# Patient Record
Sex: FEMALE | Race: OTHER | HISPANIC OR LATINO
[De-identification: names, ages, dates, MRNs, and addresses within clinical notes are randomized per-mention and may not be internally consistent; named-entity substitution may affect disease eponyms.]

---

## 2017-01-23 ENCOUNTER — RESULT REVIEW (OUTPATIENT)
Age: 52
End: 2017-01-23

## 2017-01-30 ENCOUNTER — APPOINTMENT (OUTPATIENT)
Dept: ENDOCRINOLOGY | Facility: CLINIC | Age: 52
End: 2017-01-30

## 2017-01-30 VITALS — WEIGHT: 118 LBS | BODY MASS INDEX: 21.58 KG/M2

## 2017-03-27 ENCOUNTER — APPOINTMENT (OUTPATIENT)
Dept: ENDOCRINOLOGY | Facility: CLINIC | Age: 52
End: 2017-03-27

## 2017-05-08 ENCOUNTER — APPOINTMENT (OUTPATIENT)
Dept: ENDOCRINOLOGY | Facility: CLINIC | Age: 52
End: 2017-05-08

## 2017-07-05 ENCOUNTER — OUTPATIENT (OUTPATIENT)
Dept: OUTPATIENT SERVICES | Facility: HOSPITAL | Age: 52
LOS: 1 days | End: 2017-07-05
Payer: COMMERCIAL

## 2017-07-05 ENCOUNTER — TRANSCRIPTION ENCOUNTER (OUTPATIENT)
Age: 52
End: 2017-07-05

## 2017-07-05 ENCOUNTER — APPOINTMENT (OUTPATIENT)
Dept: ULTRASOUND IMAGING | Facility: CLINIC | Age: 52
End: 2017-07-05

## 2017-07-05 DIAGNOSIS — Z36 ENCOUNTER FOR ANTENATAL SCREENING OF MOTHER: Chronic | ICD-10-CM

## 2017-07-05 DIAGNOSIS — Z00.8 ENCOUNTER FOR OTHER GENERAL EXAMINATION: ICD-10-CM

## 2017-07-05 PROCEDURE — 76770 US EXAM ABDO BACK WALL COMP: CPT

## 2017-07-14 ENCOUNTER — APPOINTMENT (OUTPATIENT)
Dept: UROLOGY | Facility: CLINIC | Age: 52
End: 2017-07-14

## 2017-07-14 VITALS
SYSTOLIC BLOOD PRESSURE: 160 MMHG | BODY MASS INDEX: 20.98 KG/M2 | TEMPERATURE: 99.1 F | HEART RATE: 91 BPM | HEIGHT: 62 IN | DIASTOLIC BLOOD PRESSURE: 77 MMHG | WEIGHT: 114 LBS

## 2017-07-16 ENCOUNTER — MEDICATION RENEWAL (OUTPATIENT)
Age: 52
End: 2017-07-16

## 2017-07-18 LAB
BILIRUB UR QL STRIP: NORMAL
GLUCOSE UR-MCNC: NORMAL
HCG UR QL: 0.2 EU/DL
HGB UR QL STRIP.AUTO: NORMAL
KETONES UR-MCNC: NORMAL
LEUKOCYTE ESTERASE UR QL STRIP: NORMAL
NITRITE UR QL STRIP: NORMAL
PH UR STRIP: 5.5
PROT UR STRIP-MCNC: NORMAL
SP GR UR STRIP: 1.02

## 2017-07-19 ENCOUNTER — RX RENEWAL (OUTPATIENT)
Age: 52
End: 2017-07-19

## 2017-07-19 PROBLEM — Z00.00 ENCOUNTER FOR PREVENTIVE HEALTH EXAMINATION: Status: ACTIVE | Noted: 2017-07-19

## 2017-07-23 ENCOUNTER — FORM ENCOUNTER (OUTPATIENT)
Age: 52
End: 2017-07-23

## 2017-07-24 ENCOUNTER — OUTPATIENT (OUTPATIENT)
Dept: OUTPATIENT SERVICES | Facility: HOSPITAL | Age: 52
LOS: 1 days | End: 2017-07-24
Payer: COMMERCIAL

## 2017-07-24 ENCOUNTER — APPOINTMENT (OUTPATIENT)
Dept: ULTRASOUND IMAGING | Facility: IMAGING CENTER | Age: 52
End: 2017-07-24

## 2017-07-24 DIAGNOSIS — Z36 ENCOUNTER FOR ANTENATAL SCREENING OF MOTHER: Chronic | ICD-10-CM

## 2017-07-24 DIAGNOSIS — Z00.8 ENCOUNTER FOR OTHER GENERAL EXAMINATION: ICD-10-CM

## 2017-07-24 PROCEDURE — 76770 US EXAM ABDO BACK WALL COMP: CPT

## 2017-08-14 ENCOUNTER — APPOINTMENT (OUTPATIENT)
Dept: ENDOCRINOLOGY | Facility: CLINIC | Age: 52
End: 2017-08-14
Payer: COMMERCIAL

## 2017-08-14 VITALS
WEIGHT: 121 LBS | BODY MASS INDEX: 22.26 KG/M2 | SYSTOLIC BLOOD PRESSURE: 140 MMHG | OXYGEN SATURATION: 98 % | DIASTOLIC BLOOD PRESSURE: 62 MMHG | HEART RATE: 89 BPM | HEIGHT: 62 IN

## 2017-08-14 DIAGNOSIS — Z78.9 OTHER SPECIFIED HEALTH STATUS: ICD-10-CM

## 2017-08-14 LAB
GLUCOSE BLDC GLUCOMTR-MCNC: 88
HBA1C MFR BLD HPLC: 8.6 %
HBA1C MFR BLD HPLC: 8.7

## 2017-08-14 PROCEDURE — 99215 OFFICE O/P EST HI 40 MIN: CPT | Mod: 25

## 2017-08-14 PROCEDURE — 83036 HEMOGLOBIN GLYCOSYLATED A1C: CPT | Mod: QW

## 2017-08-14 PROCEDURE — 82962 GLUCOSE BLOOD TEST: CPT

## 2017-08-15 ENCOUNTER — RESULT REVIEW (OUTPATIENT)
Age: 52
End: 2017-08-15

## 2017-08-15 LAB
ALBUMIN SERPL ELPH-MCNC: 3.6 G/DL
ALP BLD-CCNC: 104 U/L
ALT SERPL-CCNC: 19 U/L
ANION GAP SERPL CALC-SCNC: 16 MMOL/L
AST SERPL-CCNC: 17 U/L
BILIRUB SERPL-MCNC: 0.2 MG/DL
BUN SERPL-MCNC: 35 MG/DL
CALCIUM SERPL-MCNC: 9.2 MG/DL
CHLORIDE SERPL-SCNC: 105 MMOL/L
CHOLEST SERPL-MCNC: 165 MG/DL
CHOLEST/HDLC SERPL: 2.8 RATIO
CO2 SERPL-SCNC: 23 MMOL/L
CREAT SERPL-MCNC: 0.67 MG/DL
CREAT SPEC-SCNC: 71 MG/DL
GLUCOSE SERPL-MCNC: 76 MG/DL
HDLC SERPL-MCNC: 60 MG/DL
LDLC SERPL CALC-MCNC: 88 MG/DL
MICROALBUMIN 24H UR DL<=1MG/L-MCNC: 32.7 MG/DL
MICROALBUMIN/CREAT 24H UR-RTO: 461 MG/G
POTASSIUM SERPL-SCNC: 5.2 MMOL/L
PROT SERPL-MCNC: 7.3 G/DL
SODIUM SERPL-SCNC: 144 MMOL/L
TRIGL SERPL-MCNC: 86 MG/DL

## 2017-09-01 ENCOUNTER — FORM ENCOUNTER (OUTPATIENT)
Age: 52
End: 2017-09-01

## 2017-09-02 ENCOUNTER — OUTPATIENT (OUTPATIENT)
Dept: OUTPATIENT SERVICES | Facility: HOSPITAL | Age: 52
LOS: 1 days | End: 2017-09-02
Payer: COMMERCIAL

## 2017-09-02 ENCOUNTER — APPOINTMENT (OUTPATIENT)
Dept: CT IMAGING | Facility: IMAGING CENTER | Age: 52
End: 2017-09-02
Payer: COMMERCIAL

## 2017-09-02 DIAGNOSIS — Z00.8 ENCOUNTER FOR OTHER GENERAL EXAMINATION: ICD-10-CM

## 2017-09-02 DIAGNOSIS — N13.30 UNSPECIFIED HYDRONEPHROSIS: ICD-10-CM

## 2017-09-02 DIAGNOSIS — Z36 ENCOUNTER FOR ANTENATAL SCREENING OF MOTHER: Chronic | ICD-10-CM

## 2017-09-02 PROCEDURE — 74178 CT ABD&PLV WO CNTR FLWD CNTR: CPT

## 2017-09-02 PROCEDURE — 74178 CT ABD&PLV WO CNTR FLWD CNTR: CPT | Mod: 26

## 2017-09-05 ENCOUNTER — APPOINTMENT (OUTPATIENT)
Dept: UROLOGY | Facility: CLINIC | Age: 52
End: 2017-09-05
Payer: COMMERCIAL

## 2017-09-05 PROCEDURE — 99213 OFFICE O/P EST LOW 20 MIN: CPT

## 2017-09-07 ENCOUNTER — OUTPATIENT (OUTPATIENT)
Dept: OUTPATIENT SERVICES | Facility: HOSPITAL | Age: 52
LOS: 1 days | End: 2017-09-07
Payer: COMMERCIAL

## 2017-09-07 ENCOUNTER — RESULT REVIEW (OUTPATIENT)
Age: 52
End: 2017-09-07

## 2017-09-07 VITALS
TEMPERATURE: 97 F | DIASTOLIC BLOOD PRESSURE: 81 MMHG | HEIGHT: 62 IN | RESPIRATION RATE: 16 BRPM | OXYGEN SATURATION: 99 % | SYSTOLIC BLOOD PRESSURE: 150 MMHG | HEART RATE: 83 BPM | WEIGHT: 119.93 LBS

## 2017-09-07 DIAGNOSIS — Z98.891 HISTORY OF UTERINE SCAR FROM PREVIOUS SURGERY: Chronic | ICD-10-CM

## 2017-09-07 DIAGNOSIS — N13.30 UNSPECIFIED HYDRONEPHROSIS: ICD-10-CM

## 2017-09-07 DIAGNOSIS — Z36 ENCOUNTER FOR ANTENATAL SCREENING OF MOTHER: Chronic | ICD-10-CM

## 2017-09-07 DIAGNOSIS — N39.0 URINARY TRACT INFECTION, SITE NOT SPECIFIED: ICD-10-CM

## 2017-09-07 DIAGNOSIS — Q62.0 CONGENITAL HYDRONEPHROSIS: ICD-10-CM

## 2017-09-07 DIAGNOSIS — Z98.51 TUBAL LIGATION STATUS: Chronic | ICD-10-CM

## 2017-09-07 DIAGNOSIS — Z01.818 ENCOUNTER FOR OTHER PREPROCEDURAL EXAMINATION: ICD-10-CM

## 2017-09-07 LAB
ANION GAP SERPL CALC-SCNC: 14 MMOL/L — SIGNIFICANT CHANGE UP (ref 5–17)
BACTERIA UR CULT: ABNORMAL
BUN SERPL-MCNC: 29 MG/DL — HIGH (ref 7–23)
CALCIUM SERPL-MCNC: 9.5 MG/DL — SIGNIFICANT CHANGE UP (ref 8.4–10.5)
CHLORIDE SERPL-SCNC: 104 MMOL/L — SIGNIFICANT CHANGE UP (ref 96–108)
CO2 SERPL-SCNC: 24 MMOL/L — SIGNIFICANT CHANGE UP (ref 22–31)
CORE LAB FLUID CYTOLOGY: NORMAL
CREAT SERPL-MCNC: 0.68 MG/DL — SIGNIFICANT CHANGE UP (ref 0.5–1.3)
GLUCOSE SERPL-MCNC: 135 MG/DL — HIGH (ref 70–99)
HCT VFR BLD CALC: 30.5 % — LOW (ref 34.5–45)
HGB BLD-MCNC: 9.6 G/DL — LOW (ref 11.5–15.5)
MCHC RBC-ENTMCNC: 28.8 PG — SIGNIFICANT CHANGE UP (ref 27–34)
MCHC RBC-ENTMCNC: 31.5 GM/DL — LOW (ref 32–36)
MCV RBC AUTO: 91.6 FL — SIGNIFICANT CHANGE UP (ref 80–100)
PLATELET # BLD AUTO: 299 K/UL — SIGNIFICANT CHANGE UP (ref 150–400)
POTASSIUM SERPL-MCNC: 4.6 MMOL/L — SIGNIFICANT CHANGE UP (ref 3.5–5.3)
POTASSIUM SERPL-SCNC: 4.6 MMOL/L — SIGNIFICANT CHANGE UP (ref 3.5–5.3)
RBC # BLD: 3.33 M/UL — LOW (ref 3.8–5.2)
RBC # FLD: 14.6 % — HIGH (ref 10.3–14.5)
SODIUM SERPL-SCNC: 142 MMOL/L — SIGNIFICANT CHANGE UP (ref 135–145)
WBC # BLD: 8.8 K/UL — SIGNIFICANT CHANGE UP (ref 3.8–10.5)
WBC # FLD AUTO: 8.8 K/UL — SIGNIFICANT CHANGE UP (ref 3.8–10.5)

## 2017-09-07 PROCEDURE — G0463: CPT

## 2017-09-07 PROCEDURE — 85027 COMPLETE CBC AUTOMATED: CPT

## 2017-09-07 PROCEDURE — 80048 BASIC METABOLIC PNL TOTAL CA: CPT

## 2017-09-07 RX ORDER — SODIUM CHLORIDE 9 MG/ML
3 INJECTION INTRAMUSCULAR; INTRAVENOUS; SUBCUTANEOUS EVERY 8 HOURS
Qty: 0 | Refills: 0 | Status: DISCONTINUED | OUTPATIENT
Start: 2017-11-27 | End: 2017-12-12

## 2017-09-07 RX ORDER — CEFAZOLIN SODIUM 1 G
2000 VIAL (EA) INJECTION ONCE
Qty: 0 | Refills: 0 | Status: DISCONTINUED | OUTPATIENT
Start: 2017-11-27 | End: 2017-12-12

## 2017-09-07 RX ORDER — LIDOCAINE HCL 20 MG/ML
0.2 VIAL (ML) INJECTION ONCE
Qty: 0 | Refills: 0 | Status: DISCONTINUED | OUTPATIENT
Start: 2017-11-27 | End: 2017-12-12

## 2017-09-07 NOTE — H&P PST ADULT - PMH
Anxiety    Blindness of left eye    Diabetic retinopathy    DM (diabetes mellitus)  type 1  Essential hypertension    Hydronephrosis    Hyperlipidemia    UTI (urinary tract infection)  09/2017

## 2017-09-07 NOTE — H&P PST ADULT - NSANTHOSAYNRD_GEN_A_CORE
No. СВЕТЛАНА screening performed.  STOP BANG Legend: 0-2 = LOW Risk; 3-4 = INTERMEDIATE Risk; 5-8 = HIGH Risk

## 2017-09-07 NOTE — H&P PST ADULT - HISTORY OF PRESENT ILLNESS
Patient is a 52 year old female with PMHx of Type I Diabetes, retinopathy, Left eye blindness, HTN, h/o one episode of Left flank pain 2 months ago. A CT scan of abdomen and pelvis revealed mild Left hydronephrosis with ureteral obstruction but no stone. Urine culture was negative at the time. She now needs further evaluation and is scheduled for Cystoscopy, Left Retrograde Pyelogram, Left Ureteroscopy and Biopsy, JJ Stent Insertion.     Of note, urine culture from 09/05/17 positive for E. coli. Dr. WOLF Young notified. Patient at [facility] today (09/07/17), denies any urinary symptoms. Patient is a 52 year old female with PMHx of Type I Diabetes, retinopathy, Left eye blindness, HTN, h/o one episode of Left flank pain 2 months ago. A CT scan of abdomen and pelvis revealed mild Left hydronephrosis with ureteral obstruction but no stone. Urine culture was negative at the time. She now needs further evaluation and is scheduled for Cystoscopy, Left Retrograde Pyelogram, Left Ureteroscopy and Biopsy, JJ Stent Insertion.     Of note, urine culture from 09/05/17 positive for E. coli. Dr. WOLF Young notified. Patient at [facility] today (09/07/17), denies any urinary symptoms.   Addendum: patient is starting Bactrim tonight as per Dr. WOLF Young (09/07/17)

## 2017-09-07 NOTE — H&P PST ADULT - PROBLEM SELECTOR PLAN 2
Dr. Young notified Dr. Young notified    Medical evaluation scheduled for 09/08/17 Dr. Young notified  Spoke to the patient  at 17:30, she is starting Bactrim tonight as per Dr. WOLF Young (09/07/17)      Medical evaluation scheduled for 09/08/17

## 2017-09-08 ENCOUNTER — MESSAGE (OUTPATIENT)
Age: 52
End: 2017-09-08

## 2017-09-10 ENCOUNTER — EMERGENCY (EMERGENCY)
Facility: HOSPITAL | Age: 52
LOS: 1 days | Discharge: HOME | End: 2017-09-10
Attending: EMERGENCY MEDICINE | Admitting: EMERGENCY MEDICINE
Payer: COMMERCIAL

## 2017-09-10 VITALS — HEART RATE: 85 BPM | RESPIRATION RATE: 14 BRPM | DIASTOLIC BLOOD PRESSURE: 76 MMHG | SYSTOLIC BLOOD PRESSURE: 166 MMHG

## 2017-09-10 DIAGNOSIS — Z98.51 TUBAL LIGATION STATUS: Chronic | ICD-10-CM

## 2017-09-10 DIAGNOSIS — Z98.891 HISTORY OF UTERINE SCAR FROM PREVIOUS SURGERY: Chronic | ICD-10-CM

## 2017-09-10 PROCEDURE — 73030 X-RAY EXAM OF SHOULDER: CPT | Mod: 26,RT

## 2017-09-10 PROCEDURE — 73030 X-RAY EXAM OF SHOULDER: CPT

## 2017-09-10 PROCEDURE — 73060 X-RAY EXAM OF HUMERUS: CPT

## 2017-09-10 PROCEDURE — 99284 EMERGENCY DEPT VISIT MOD MDM: CPT

## 2017-09-10 PROCEDURE — 73060 X-RAY EXAM OF HUMERUS: CPT | Mod: 26,RT

## 2017-09-10 PROCEDURE — 99284 EMERGENCY DEPT VISIT MOD MDM: CPT | Mod: 25

## 2017-09-10 RX ORDER — OXYCODONE HYDROCHLORIDE 5 MG/1
1 TABLET ORAL
Qty: 16 | Refills: 0 | OUTPATIENT
Start: 2017-09-10 | End: 2017-09-14

## 2017-09-10 RX ORDER — IBUPROFEN 200 MG
1 TABLET ORAL
Qty: 28 | Refills: 0 | OUTPATIENT
Start: 2017-09-10 | End: 2017-09-17

## 2017-09-10 RX ORDER — OXYCODONE HYDROCHLORIDE 5 MG/1
5 TABLET ORAL ONCE
Qty: 0 | Refills: 0 | Status: DISCONTINUED | OUTPATIENT
Start: 2017-09-10 | End: 2017-09-10

## 2017-09-10 RX ORDER — IBUPROFEN 200 MG
600 TABLET ORAL ONCE
Qty: 0 | Refills: 0 | Status: COMPLETED | OUTPATIENT
Start: 2017-09-10 | End: 2017-09-10

## 2017-09-10 RX ORDER — ACETAMINOPHEN 500 MG
975 TABLET ORAL ONCE
Qty: 0 | Refills: 0 | Status: COMPLETED | OUTPATIENT
Start: 2017-09-10 | End: 2017-09-10

## 2017-09-10 RX ADMIN — OXYCODONE HYDROCHLORIDE 5 MILLIGRAM(S): 5 TABLET ORAL at 18:43

## 2017-09-10 RX ADMIN — Medication 600 MILLIGRAM(S): at 19:36

## 2017-09-10 RX ADMIN — Medication 975 MILLIGRAM(S): at 19:36
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
## 2017-09-10 NOTE — ED PROVIDER NOTE - MEDICAL DECISION MAKING DETAILS
Attending Note (Rossana): patient with rue pain after mechanical injury. nv intact. xrays r/o fracture/dislocation.

## 2017-09-11 ENCOUNTER — APPOINTMENT (OUTPATIENT)
Dept: UROLOGY | Facility: HOSPITAL | Age: 52
End: 2017-09-11

## 2017-10-25 ENCOUNTER — MESSAGE (OUTPATIENT)
Age: 52
End: 2017-10-25

## 2017-11-16 ENCOUNTER — RX RENEWAL (OUTPATIENT)
Age: 52
End: 2017-11-16

## 2017-11-20 ENCOUNTER — OUTPATIENT (OUTPATIENT)
Dept: OUTPATIENT SERVICES | Facility: HOSPITAL | Age: 52
LOS: 1 days | End: 2017-11-20
Payer: MEDICAID

## 2017-11-20 VITALS
WEIGHT: 111.99 LBS | HEART RATE: 96 BPM | TEMPERATURE: 98 F | DIASTOLIC BLOOD PRESSURE: 82 MMHG | OXYGEN SATURATION: 99 % | HEIGHT: 62 IN | SYSTOLIC BLOOD PRESSURE: 150 MMHG | RESPIRATION RATE: 16 BRPM

## 2017-11-20 DIAGNOSIS — Z01.818 ENCOUNTER FOR OTHER PREPROCEDURAL EXAMINATION: ICD-10-CM

## 2017-11-20 DIAGNOSIS — Z98.891 HISTORY OF UTERINE SCAR FROM PREVIOUS SURGERY: Chronic | ICD-10-CM

## 2017-11-20 DIAGNOSIS — Z98.51 TUBAL LIGATION STATUS: Chronic | ICD-10-CM

## 2017-11-20 DIAGNOSIS — E10.39 TYPE 1 DIABETES MELLITUS WITH OTHER DIABETIC OPHTHALMIC COMPLICATION: ICD-10-CM

## 2017-11-20 DIAGNOSIS — Z87.81 PERSONAL HISTORY OF (HEALED) TRAUMATIC FRACTURE: Chronic | ICD-10-CM

## 2017-11-20 DIAGNOSIS — N13.30 UNSPECIFIED HYDRONEPHROSIS: ICD-10-CM

## 2017-11-20 DIAGNOSIS — I10 ESSENTIAL (PRIMARY) HYPERTENSION: ICD-10-CM

## 2017-11-20 LAB
ANION GAP SERPL CALC-SCNC: 12 MMOL/L — SIGNIFICANT CHANGE UP (ref 5–17)
BUN SERPL-MCNC: 32 MG/DL — HIGH (ref 7–23)
CALCIUM SERPL-MCNC: 10 MG/DL — SIGNIFICANT CHANGE UP (ref 8.4–10.5)
CHLORIDE SERPL-SCNC: 104 MMOL/L — SIGNIFICANT CHANGE UP (ref 96–108)
CO2 SERPL-SCNC: 22 MMOL/L — SIGNIFICANT CHANGE UP (ref 22–31)
CREAT SERPL-MCNC: 0.85 MG/DL — SIGNIFICANT CHANGE UP (ref 0.5–1.3)
GLUCOSE SERPL-MCNC: 136 MG/DL — HIGH (ref 70–99)
HBA1C BLD-MCNC: 8 % — HIGH (ref 4–5.6)
HCT VFR BLD CALC: 35.5 % — SIGNIFICANT CHANGE UP (ref 34.5–45)
HGB BLD-MCNC: 11.4 G/DL — LOW (ref 11.5–15.5)
MCHC RBC-ENTMCNC: 28.9 PG — SIGNIFICANT CHANGE UP (ref 27–34)
MCHC RBC-ENTMCNC: 32.1 GM/DL — SIGNIFICANT CHANGE UP (ref 32–36)
MCV RBC AUTO: 89.9 FL — SIGNIFICANT CHANGE UP (ref 80–100)
PLATELET # BLD AUTO: 305 K/UL — SIGNIFICANT CHANGE UP (ref 150–400)
POTASSIUM SERPL-MCNC: 6.3 MMOL/L — CRITICAL HIGH (ref 3.5–5.3)
POTASSIUM SERPL-SCNC: 6.3 MMOL/L — CRITICAL HIGH (ref 3.5–5.3)
RBC # BLD: 3.95 M/UL — SIGNIFICANT CHANGE UP (ref 3.8–5.2)
RBC # FLD: 13.9 % — SIGNIFICANT CHANGE UP (ref 10.3–14.5)
SODIUM SERPL-SCNC: 138 MMOL/L — SIGNIFICANT CHANGE UP (ref 135–145)
WBC # BLD: 9.8 K/UL — SIGNIFICANT CHANGE UP (ref 3.8–10.5)
WBC # FLD AUTO: 9.8 K/UL — SIGNIFICANT CHANGE UP (ref 3.8–10.5)

## 2017-11-20 RX ORDER — SODIUM CHLORIDE 9 MG/ML
3 INJECTION INTRAMUSCULAR; INTRAVENOUS; SUBCUTANEOUS EVERY 8 HOURS
Qty: 0 | Refills: 0 | Status: DISCONTINUED | OUTPATIENT
Start: 2017-11-27 | End: 2017-12-12

## 2017-11-20 RX ORDER — CEFAZOLIN SODIUM 1 G
2000 VIAL (EA) INJECTION ONCE
Qty: 0 | Refills: 0 | Status: DISCONTINUED | OUTPATIENT
Start: 2017-11-27 | End: 2017-12-12

## 2017-11-20 NOTE — H&P PST ADULT - PMH
Anxiety    Blindness of left eye    Diabetic retinopathy    DM (diabetes mellitus)  type 1- since 1980  Essential hypertension    Hydronephrosis    Hyperlipidemia    UTI (urinary tract infection)  09/2017

## 2017-11-20 NOTE — H&P PST ADULT - VISION (WITH CORRECTIVE LENSES IF THE PATIENT USUALLY WEARS THEM):
Partially impaired: cannot see medication labels or newsprint, but can see obstacles in path, and the surrounding layout; can count fingers at arm's length Left eye blindness/Partially impaired: cannot see medication labels or newsprint, but can see obstacles in path, and the surrounding layout; can count fingers at arm's length

## 2017-11-20 NOTE — H&P PST ADULT - HISTORY OF PRESENT ILLNESS
Patient is a 52 year old female with PMHx of Type I Diabetes, retinopathy, Left eye blindness, HTN, h/o one episode of Left flank pain 2 months ago. A CT scan of abdomen and pelvis revealed mild Left hydronephrosis with ureteral obstruction but no stone. Urine culture was negative at the time. She now needs further evaluation and is scheduled for Cystoscopy, Left Retrograde Pyelogram, Left Ureteroscopy and Biopsy, JJ Stent Insertion.     Of note, urine culture from 09/05/17 positive for E. coli. Dr. WOLF Young notified. Patient at [email or web address] today (09/07/17), denies any urinary symptoms.   Addendum: patient is starting Bactrim tonight as per Dr. WOLF Young (09/07/17) Patient is a 52 year old female with PMHx of Type I Diabetes, retinopathy, Left eye blindness, HTN, h/o one episode of Left flank pain in 07/2017. Pt had urology consult- s/p pelvic sonogram/ CT scan of abdomen and pelvis revealed mild Left hydronephrosis with ureteral obstruction but no stone. Pt for Cystoscopy, Left Retrograde Pyelogram, Left Ureteroscopy and Biopsy, JJ Stent Insertion.                    ****Surgery was cancelled in 09/2017 due right shoulder fracture & pt had right shoulder repair on 09/21/2017

## 2017-11-21 ENCOUNTER — EMERGENCY (EMERGENCY)
Facility: HOSPITAL | Age: 52
LOS: 1 days | Discharge: HOME | End: 2017-11-21
Attending: EMERGENCY MEDICINE
Payer: MEDICAID

## 2017-11-21 ENCOUNTER — MESSAGE (OUTPATIENT)
Age: 52
End: 2017-11-21

## 2017-11-21 VITALS
HEART RATE: 102 BPM | SYSTOLIC BLOOD PRESSURE: 198 MMHG | OXYGEN SATURATION: 99 % | RESPIRATION RATE: 18 BRPM | DIASTOLIC BLOOD PRESSURE: 75 MMHG | TEMPERATURE: 97 F

## 2017-11-21 DIAGNOSIS — Z98.891 HISTORY OF UTERINE SCAR FROM PREVIOUS SURGERY: Chronic | ICD-10-CM

## 2017-11-21 DIAGNOSIS — Z87.81 PERSONAL HISTORY OF (HEALED) TRAUMATIC FRACTURE: Chronic | ICD-10-CM

## 2017-11-21 DIAGNOSIS — Z98.51 TUBAL LIGATION STATUS: Chronic | ICD-10-CM

## 2017-11-21 LAB
CULTURE RESULTS: SIGNIFICANT CHANGE UP
SPECIMEN SOURCE: SIGNIFICANT CHANGE UP

## 2017-11-21 PROCEDURE — 99284 EMERGENCY DEPT VISIT MOD MDM: CPT | Mod: 25

## 2017-11-21 PROCEDURE — 93010 ELECTROCARDIOGRAM REPORT: CPT

## 2017-11-21 NOTE — ED ADULT NURSE NOTE - OBJECTIVE STATEMENT
pt states, "I had blood work drawn for presurgical testing and today I got a call saying that my potassium was elevated (6.2) and they wanted me to come into the ED to get an EKG done and get something to lower my potassium" pt denies any SOB, chest pain, n/v/d at present

## 2017-11-21 NOTE — ED ADULT NURSE NOTE - CHPI ED SYMPTOMS NEG
no tingling/no weakness/no vomiting/no dizziness/no numbness/no pain/no chills/no fever/no nausea/no decreased eating/drinking

## 2017-11-22 ENCOUNTER — RESULT REVIEW (OUTPATIENT)
Age: 52
End: 2017-11-22

## 2017-11-22 VITALS
DIASTOLIC BLOOD PRESSURE: 68 MMHG | RESPIRATION RATE: 18 BRPM | OXYGEN SATURATION: 98 % | TEMPERATURE: 98 F | HEART RATE: 98 BPM | SYSTOLIC BLOOD PRESSURE: 172 MMHG

## 2017-11-22 LAB
ALBUMIN SERPL ELPH-MCNC: 3.6 G/DL — SIGNIFICANT CHANGE UP (ref 3.3–5)
ALBUMIN SERPL ELPH-MCNC: 3.9 G/DL — SIGNIFICANT CHANGE UP (ref 3.3–5)
ALP SERPL-CCNC: 126 U/L — HIGH (ref 40–120)
ALP SERPL-CCNC: 151 U/L — HIGH (ref 40–120)
ALT FLD-CCNC: 24 U/L RC — SIGNIFICANT CHANGE UP (ref 10–45)
ALT FLD-CCNC: 25 U/L RC — SIGNIFICANT CHANGE UP (ref 10–45)
ANION GAP SERPL CALC-SCNC: 12 MMOL/L — SIGNIFICANT CHANGE UP (ref 5–17)
ANION GAP SERPL CALC-SCNC: 14 MMOL/L — SIGNIFICANT CHANGE UP (ref 5–17)
AST SERPL-CCNC: 13 U/L — SIGNIFICANT CHANGE UP (ref 10–40)
AST SERPL-CCNC: 17 U/L — SIGNIFICANT CHANGE UP (ref 10–40)
BASE EXCESS BLDV CALC-SCNC: -2.8 MMOL/L — LOW (ref -2–2)
BASOPHILS # BLD AUTO: 0.1 K/UL — SIGNIFICANT CHANGE UP (ref 0–0.2)
BASOPHILS NFR BLD AUTO: 0.6 % — SIGNIFICANT CHANGE UP (ref 0–2)
BILIRUB SERPL-MCNC: 0.2 MG/DL — SIGNIFICANT CHANGE UP (ref 0.2–1.2)
BILIRUB SERPL-MCNC: 0.2 MG/DL — SIGNIFICANT CHANGE UP (ref 0.2–1.2)
BUN SERPL-MCNC: 32 MG/DL — HIGH (ref 7–23)
BUN SERPL-MCNC: 33 MG/DL — HIGH (ref 7–23)
CA-I SERPL-SCNC: 1.34 MMOL/L — HIGH (ref 1.12–1.3)
CALCIUM SERPL-MCNC: 9.5 MG/DL — SIGNIFICANT CHANGE UP (ref 8.4–10.5)
CALCIUM SERPL-MCNC: 9.8 MG/DL — SIGNIFICANT CHANGE UP (ref 8.4–10.5)
CHLORIDE BLDV-SCNC: 108 MMOL/L — SIGNIFICANT CHANGE UP (ref 96–108)
CHLORIDE SERPL-SCNC: 103 MMOL/L — SIGNIFICANT CHANGE UP (ref 96–108)
CHLORIDE SERPL-SCNC: 105 MMOL/L — SIGNIFICANT CHANGE UP (ref 96–108)
CO2 BLDV-SCNC: 24 MMOL/L — SIGNIFICANT CHANGE UP (ref 22–30)
CO2 SERPL-SCNC: 23 MMOL/L — SIGNIFICANT CHANGE UP (ref 22–31)
CO2 SERPL-SCNC: 24 MMOL/L — SIGNIFICANT CHANGE UP (ref 22–31)
CREAT SERPL-MCNC: 0.81 MG/DL — SIGNIFICANT CHANGE UP (ref 0.5–1.3)
CREAT SERPL-MCNC: 0.89 MG/DL — SIGNIFICANT CHANGE UP (ref 0.5–1.3)
EOSINOPHIL # BLD AUTO: 0.2 K/UL — SIGNIFICANT CHANGE UP (ref 0–0.5)
EOSINOPHIL NFR BLD AUTO: 1.6 % — SIGNIFICANT CHANGE UP (ref 0–6)
GAS PNL BLDV: 139 MMOL/L — SIGNIFICANT CHANGE UP (ref 136–145)
GAS PNL BLDV: SIGNIFICANT CHANGE UP
GLUCOSE BLDV-MCNC: 117 MG/DL — HIGH (ref 70–99)
GLUCOSE SERPL-MCNC: 124 MG/DL — HIGH (ref 70–99)
GLUCOSE SERPL-MCNC: 375 MG/DL — HIGH (ref 70–99)
HCO3 BLDV-SCNC: 23 MMOL/L — SIGNIFICANT CHANGE UP (ref 21–29)
HCT VFR BLD CALC: 35.4 % — SIGNIFICANT CHANGE UP (ref 34.5–45)
HCT VFR BLDA CALC: 33 % — LOW (ref 39–50)
HGB BLD CALC-MCNC: 10.6 G/DL — LOW (ref 11.5–15.5)
HGB BLD-MCNC: 11.5 G/DL — SIGNIFICANT CHANGE UP (ref 11.5–15.5)
LACTATE BLDV-MCNC: 2.2 MMOL/L — HIGH (ref 0.7–2)
LYMPHOCYTES # BLD AUTO: 2.3 K/UL — SIGNIFICANT CHANGE UP (ref 1–3.3)
LYMPHOCYTES # BLD AUTO: 24 % — SIGNIFICANT CHANGE UP (ref 13–44)
MCHC RBC-ENTMCNC: 29.5 PG — SIGNIFICANT CHANGE UP (ref 27–34)
MCHC RBC-ENTMCNC: 32.6 GM/DL — SIGNIFICANT CHANGE UP (ref 32–36)
MCV RBC AUTO: 90.6 FL — SIGNIFICANT CHANGE UP (ref 80–100)
MONOCYTES # BLD AUTO: 0.4 K/UL — SIGNIFICANT CHANGE UP (ref 0–0.9)
MONOCYTES NFR BLD AUTO: 3.8 % — SIGNIFICANT CHANGE UP (ref 2–14)
NEUTROPHILS # BLD AUTO: 6.7 K/UL — SIGNIFICANT CHANGE UP (ref 1.8–7.4)
NEUTROPHILS NFR BLD AUTO: 70 % — SIGNIFICANT CHANGE UP (ref 43–77)
OTHER CELLS CSF MANUAL: 7 ML/DL — LOW (ref 18–22)
PCO2 BLDV: 46 MMHG — SIGNIFICANT CHANGE UP (ref 35–50)
PH BLDV: 7.32 — LOW (ref 7.35–7.45)
PLATELET # BLD AUTO: 263 K/UL — SIGNIFICANT CHANGE UP (ref 150–400)
PO2 BLDV: 28 MMHG — SIGNIFICANT CHANGE UP (ref 25–45)
POTASSIUM BLDV-SCNC: 3.9 MMOL/L — SIGNIFICANT CHANGE UP (ref 3.5–5)
POTASSIUM SERPL-MCNC: 4 MMOL/L — SIGNIFICANT CHANGE UP (ref 3.5–5.3)
POTASSIUM SERPL-MCNC: 5.9 MMOL/L — HIGH (ref 3.5–5.3)
POTASSIUM SERPL-SCNC: 4 MMOL/L — SIGNIFICANT CHANGE UP (ref 3.5–5.3)
POTASSIUM SERPL-SCNC: 5.9 MMOL/L — HIGH (ref 3.5–5.3)
PROT SERPL-MCNC: 7 G/DL — SIGNIFICANT CHANGE UP (ref 6–8.3)
PROT SERPL-MCNC: 7.9 G/DL — SIGNIFICANT CHANGE UP (ref 6–8.3)
RBC # BLD: 3.91 M/UL — SIGNIFICANT CHANGE UP (ref 3.8–5.2)
RBC # FLD: 12.4 % — SIGNIFICANT CHANGE UP (ref 10.3–14.5)
SAO2 % BLDV: 46 % — LOW (ref 67–88)
SODIUM SERPL-SCNC: 139 MMOL/L — SIGNIFICANT CHANGE UP (ref 135–145)
SODIUM SERPL-SCNC: 142 MMOL/L — SIGNIFICANT CHANGE UP (ref 135–145)
WBC # BLD: 9.5 K/UL — SIGNIFICANT CHANGE UP (ref 3.8–10.5)
WBC # FLD AUTO: 9.5 K/UL — SIGNIFICANT CHANGE UP (ref 3.8–10.5)

## 2017-11-22 PROCEDURE — 96375 TX/PRO/DX INJ NEW DRUG ADDON: CPT

## 2017-11-22 PROCEDURE — 99284 EMERGENCY DEPT VISIT MOD MDM: CPT | Mod: 25

## 2017-11-22 PROCEDURE — 80053 COMPREHEN METABOLIC PANEL: CPT

## 2017-11-22 PROCEDURE — 93005 ELECTROCARDIOGRAM TRACING: CPT

## 2017-11-22 PROCEDURE — 85027 COMPLETE CBC AUTOMATED: CPT

## 2017-11-22 PROCEDURE — 96374 THER/PROPH/DIAG INJ IV PUSH: CPT

## 2017-11-22 PROCEDURE — 84132 ASSAY OF SERUM POTASSIUM: CPT

## 2017-11-22 PROCEDURE — 94640 AIRWAY INHALATION TREATMENT: CPT

## 2017-11-22 PROCEDURE — 84295 ASSAY OF SERUM SODIUM: CPT

## 2017-11-22 PROCEDURE — 83605 ASSAY OF LACTIC ACID: CPT

## 2017-11-22 PROCEDURE — 82947 ASSAY GLUCOSE BLOOD QUANT: CPT

## 2017-11-22 PROCEDURE — 82435 ASSAY OF BLOOD CHLORIDE: CPT

## 2017-11-22 PROCEDURE — 82803 BLOOD GASES ANY COMBINATION: CPT

## 2017-11-22 PROCEDURE — 85014 HEMATOCRIT: CPT

## 2017-11-22 PROCEDURE — 82330 ASSAY OF CALCIUM: CPT

## 2017-11-22 RX ORDER — INSULIN HUMAN 100 [IU]/ML
10 INJECTION, SOLUTION SUBCUTANEOUS ONCE
Qty: 0 | Refills: 0 | Status: COMPLETED | OUTPATIENT
Start: 2017-11-22 | End: 2017-11-22

## 2017-11-22 RX ORDER — CALCIUM GLUCONATE 100 MG/ML
1 VIAL (ML) INTRAVENOUS ONCE
Qty: 0 | Refills: 0 | Status: COMPLETED | OUTPATIENT
Start: 2017-11-22 | End: 2017-11-22

## 2017-11-22 RX ORDER — ALBUTEROL 90 UG/1
2.5 AEROSOL, METERED ORAL ONCE
Qty: 0 | Refills: 0 | Status: COMPLETED | OUTPATIENT
Start: 2017-11-22 | End: 2017-11-22

## 2017-11-22 RX ADMIN — Medication 200 GRAM(S): at 02:36

## 2017-11-22 RX ADMIN — ALBUTEROL 2.5 MILLIGRAM(S): 90 AEROSOL, METERED ORAL at 02:09

## 2017-11-22 RX ADMIN — INSULIN HUMAN 10 UNIT(S): 100 INJECTION, SOLUTION SUBCUTANEOUS at 02:09

## 2017-11-22 NOTE — ED PROVIDER NOTE - ATTENDING CONTRIBUTION TO CARE
52F hx T1DM, HTN, HLD, Hydronephrosis. Presents to the ER for hyperkalemia sent from pre admission testing, with no sts noted.  pt with k 5.9 subtle tenting v3-4 noted, to treat for hyperkalemia and recheck k for possible discharge.

## 2017-11-22 NOTE — ED PROVIDER NOTE - CARE PLAN
Principal Discharge DX:	Hyperkalemia  Instructions for follow-up, activity and diet:	Follow up with your medical doctor in 2-3 days or call our clinic at [phone] and state you were seen in the Emergency Department and would like to be seen in clinic. If you experience muscle weakness, chest pain, palpations, shortness of breath seek urgent medical attention. Avoid eating foods that increase potassium i.e bananas, avocados, spinach Principal Discharge DX:	Hyperkalemia  Instructions for follow-up, activity and diet:	Follow up with your cardiologist and PMD in 2-3 days or call our clinic at [phone] and state you were seen in the Emergency Department and would like to be seen in clinic. If you experience muscle weakness, chest pain, palpations, shortness of breath seek urgent medical attention. Avoid eating foods that increase potassium i.e bananas, avocados, spinach.

## 2017-11-22 NOTE — ED PROVIDER NOTE - PLAN OF CARE
HPI Comments: Is here with nausea vomiting and diarrhea. Diarrhea began around 3:00 this afternoon. Had both nausea and vomiting at one point had a temperature up to 102.9. No recent trips or travels. No close contacts with similar. No blood or mucus to her stool. No history of baseline GI diseases. Patient's water sources. Really no significant abdominal discomfort. Denies any other symptoms such as cough sputum sore throat or otherno recent antibiotic use. No history of the same in the past.history of abdominal surgery. No history of known inflammatory bowel disease. Patient is a 27 y.o. female presenting with abdominal pain. The history is provided by the patient. Abdominal Pain    This is a new problem. The pain is associated with an unknown factor. The pain is located in the generalized abdominal region. The quality of the pain is cramping. The pain is mild. Associated symptoms include a fever, diarrhea, nausea and vomiting. Pertinent negatives include no hematochezia, no melena, no dysuria, no frequency and no hematuria. Nothing worsens the pain. The pain is relieved by nothing. Her past medical history does not include PUD, GERD, ulcerative colitis, Crohn's disease, irritable bowel syndrome, pancreatitis or diverticulitis.         Past Medical History:   Diagnosis Date    Anemia     Anemia in pregnancy 2/25/2016    Celiac disease     Genital herpes, unspecified     History    Herpes 1/10/2014    Mastitis     2 surgeries/right breast    Palpitations     Postpartum depression     no meds currently    Rh negative state in antepartum period 9/19/2013    Rhesus isoimmunization affecting pregnancy     Supervision of normal subsequent pregnancy 10/9/2015    1)HSV 2)rh neg 3)rubella nonimmune 4)anemia     Syncope and collapse     Unspecified ovarian cysts        Past Surgical History:   Procedure Laterality Date    BREAST SURGERY PROCEDURE UNLISTED      Mastitis, drained    HX CHOLECYSTECTOMY      HX CYST INCISION AND DRAINAGE  1/31/2014    RIGHT INCISION AND DRAINAGE BREAST  performed by Marcel Maloney MD at [address] [place] [address] HX CYST INCISION AND DRAINAGE  2/11/2014    INCISION AND DRAINAGE RIGHT BREAST performed by Marcel Maloney MD at [facility] MAIN OR    HX WISDOM TEETH EXTRACTION           Family History:   Problem Relation Age of Onset    Alcohol abuse Neg Hx     Arthritis-osteo Neg Hx     Cancer Neg Hx     Elevated Lipids Neg Hx     Heart Disease Neg Hx     Headache Neg Hx     Lung Disease Neg Hx     Colon Cancer Neg Hx        Social History     Social History    Marital status: SINGLE     Spouse name: N/A    Number of children: 1    Years of education: 15     Occupational History    Not on file. Social History Main Topics    Smoking status: Never Smoker    Smokeless tobacco: Never Used    Alcohol use No      Comment: social/none since + UCG    Drug use: No    Sexual activity: Yes     Partners: Male     Birth control/ protection: IUD     Other Topics Concern    Not on file     Social History Narrative         ALLERGIES: Review of patient's allergies indicates no known allergies. Review of Systems   Constitutional: Positive for fever. Gastrointestinal: Positive for abdominal pain, diarrhea, nausea and vomiting. Negative for hematochezia and melena. Genitourinary: Negative for dysuria, frequency and hematuria. All other systems reviewed and are negative. Vitals:    08/03/17 2059   BP: 100/55   Pulse: (!) 115   Resp: 16   Temp: 100.4 °F (38 °C)   SpO2: 98%   Weight: 74.4 kg (164 lb)   Height: 5' 3\" (1.6 m)            Physical Exam   Constitutional: She appears well-developed and well-nourished. No distress. Not toxic or septic   HENT:   Head: Atraumatic. Somewhat dry MM   Eyes: No scleral icterus. Neck: Neck supple. Cardiovascular: Normal rate. No murmur heard. Initial mild tachycardia   Pulmonary/Chest: Effort normal. No respiratory distress. Abdominal: Soft. She exhibits no distension. There is no tenderness. There is no rebound and no guarding. No peritoneal signs. No CVAT   Musculoskeletal: Normal range of motion. Neurological: She is alert. Skin: Skin is warm and dry. Psychiatric: Her behavior is normal. Thought content normal.   Nursing note and vitals reviewed. MDM  Number of Diagnoses or Management Options  Nausea vomiting and diarrhea:   Diagnosis management comments: Febrile illness with nausea vomiting and diarrhea. rehydrate patient check lab work if possible do a C. Difficile study. rehydrate. Will provide symptomatic treatment at discharge if no source is identified. Amount and/or Complexity of Data Reviewed  Clinical lab tests: reviewed and ordered  Obtain history from someone other than the patient: yes    Risk of Complications, Morbidity, and/or Mortality  Presenting problems: high  Management options: moderate  General comments: To recheck with worsening    Patient Progress  Patient progress: improved    ED Course       Procedures    Recent Results (from the past 12 hour(s))   HCG URINE, QL. - POC    Collection Time: 08/03/17  9:16 PM   Result Value Ref Range    Pregnancy test,urine (POC) NEGATIVE  NEG     CBC WITH AUTOMATED DIFF    Collection Time: 08/03/17 10:21 PM   Result Value Ref Range    WBC 5.2 4.3 - 11.1 K/uL    RBC 5.04 4.05 - 5.25 M/uL    HGB 13.8 11.7 - 15.4 g/dL    HCT 40.2 35.8 - 46.3 %    MCV 79.8 79.6 - 97.8 FL    MCH 27.4 26.1 - 32.9 PG    MCHC 34.3 31.4 - 35.0 g/dL    RDW 13.7 11.9 - 14.6 %    PLATELET 351 779 - 753 K/uL    MPV 10.0 (L) 10.8 - 14.1 FL    DF AUTOMATED      NEUTROPHILS 80 (H) 43 - 78 %    LYMPHOCYTES 12 (L) 13 - 44 %    MONOCYTES 6 4.0 - 12.0 %    EOSINOPHILS 2 0.5 - 7.8 %    BASOPHILS 0 0.0 - 2.0 %    IMMATURE GRANULOCYTES 0.2 0.0 - 5.0 %    ABS. NEUTROPHILS 4.2 1.7 - 8.2 K/UL    ABS. LYMPHOCYTES 0.6 0.5 - 4.6 K/UL    ABS. MONOCYTES 0.3 0.1 - 1.3 K/UL    ABS.  EOSINOPHILS 0.1 0.0 - 0.8 K/UL    ABS. BASOPHILS 0.0 0.0 - 0.2 K/UL    ABS. IMM. GRANS. 0.0 0.0 - 0.5 K/UL   METABOLIC PANEL, BASIC    Collection Time: 08/03/17 10:21 PM   Result Value Ref Range    Sodium 138 136 - 145 mmol/L    Potassium 3.4 (L) 3.5 - 5.1 mmol/L    Chloride 103 98 - 107 mmol/L    CO2 22 21 - 32 mmol/L    Anion gap 13 7 - 16 mmol/L    Glucose 102 (H) 65 - 100 mg/dL    BUN 11 6 - 23 MG/DL    Creatinine 0.85 0.6 - 1.0 MG/DL    GFR est AA >60 >60 ml/min/1.73m2    GFR est non-AA >60 >60 ml/min/1.73m2    Calcium 8.3 8.3 - 81.7 MG/DL   METABOLIC PANEL, COMPREHENSIVE    Collection Time: 08/03/17 11:40 PM   Result Value Ref Range    Sodium 137 136 - 145 mmol/L    Potassium 3.3 (L) 3.5 - 5.1 mmol/L    Chloride 102 98 - 107 mmol/L    CO2 22 21 - 32 mmol/L    Anion gap 13 7 - 16 mmol/L    Glucose 100 65 - 100 mg/dL    BUN 11 6 - 23 MG/DL    Creatinine 0.81 0.6 - 1.0 MG/DL    GFR est AA >60 >60 ml/min/1.73m2    GFR est non-AA >60 >60 ml/min/1.73m2    Calcium 8.2 (L) 8.3 - 10.4 MG/DL    Bilirubin, total 0.6 0.2 - 1.1 MG/DL    ALT (SGPT) 25 12 - 65 U/L    AST (SGOT) 25 15 - 37 U/L    Alk.  phosphatase 52 50 - 136 U/L    Protein, total 7.2 6.3 - 8.2 g/dL    Albumin 3.3 (L) 3.5 - 5.0 g/dL    Globulin 3.9 (H) 2.3 - 3.5 g/dL    A-G Ratio 0.8 (L) 1.2 - 3.5                   Here with nausea vomiting Follow up with your medical doctor in 2-3 days or call our clinic at [phone] and state you were seen in the Emergency Department and would like to be seen in clinic. If you experience muscle weakness, chest pain, palpations, shortness of breath seek urgent medical attention. Avoid eating foods that increase potassium i.e bananas, avocados, spinach Follow up with your cardiologist and PMD in 2-3 days or call our clinic at [phone] and state you were seen in the Emergency Department and would like to be seen in clinic. If you experience muscle weakness, chest pain, palpations, shortness of breath seek urgent medical attention. Avoid eating foods that increase potassium i.e bananas, avocados, spinach.

## 2017-11-22 NOTE — ED PROVIDER NOTE - MEDICAL DECISION MAKING DETAILS
52F hx T1DM, HTN, HLD, Hydronephrosis. Presents to the ER for hyperkalemia. Patient hemodynamically stable and in no acute distress. Asymptomatic. Will obtain EKG, CBC and CMP.

## 2017-11-22 NOTE — ED PROVIDER NOTE - PROGRESS NOTE DETAILS
CBC WNL, CMP significant for K of 5.9 glucose of 375 alk phos 151. EKG shows slightly peaked T-waves on V3 a change from July 2016. will administer albuterol, 10U insulin and 1g calcium gluconate then repeat bmp and EKG. repeat ekg shows decrease in t wave peak. VBG potassium 3.9.

## 2017-11-22 NOTE — ED PROVIDER NOTE - CHPI ED SYMPTOMS NEG
no back pain/no vomiting/no pain/no chills/no dizziness/no fever/no headache/no loss of consciousness/no nausea/no decreased eating/drinking

## 2017-11-22 NOTE — ED PROVIDER NOTE - OBJECTIVE STATEMENT
52F hx T1DM, HTN, HLD, Hydronephrosis. Presents to the ER for hyperkalemia. Patient had presurgical testing performed for cystoscopy to evaluate hydronephrosis and was told her potassium was elevated (6.3) . Patient is asymptomatic at this time and denies and chest pain, nausea 52F hx T1DM, HTN, HLD, Hydronephrosis. Presents to the ER for hyperkalemia. Patient had presurgical testing performed for cystoscopy to evaluate hydronephrosis and was told her potassium was elevated (6.3) . Patient is asymptomatic at this time and denies and chest pain, nausea, vomiting, diarrhea.

## 2017-11-27 ENCOUNTER — TRANSCRIPTION ENCOUNTER (OUTPATIENT)
Age: 52
End: 2017-11-27

## 2017-11-27 ENCOUNTER — OUTPATIENT (OUTPATIENT)
Dept: OUTPATIENT SERVICES | Facility: HOSPITAL | Age: 52
LOS: 1 days | End: 2017-11-27
Payer: MEDICAID

## 2017-11-27 ENCOUNTER — APPOINTMENT (OUTPATIENT)
Dept: UROLOGY | Facility: HOSPITAL | Age: 52
End: 2017-11-27

## 2017-11-27 VITALS
RESPIRATION RATE: 18 BRPM | TEMPERATURE: 98 F | DIASTOLIC BLOOD PRESSURE: 84 MMHG | SYSTOLIC BLOOD PRESSURE: 158 MMHG | OXYGEN SATURATION: 100 % | HEART RATE: 78 BPM

## 2017-11-27 VITALS
TEMPERATURE: 98 F | SYSTOLIC BLOOD PRESSURE: 173 MMHG | OXYGEN SATURATION: 100 % | RESPIRATION RATE: 16 BRPM | DIASTOLIC BLOOD PRESSURE: 82 MMHG | HEART RATE: 79 BPM

## 2017-11-27 DIAGNOSIS — N13.30 UNSPECIFIED HYDRONEPHROSIS: ICD-10-CM

## 2017-11-27 DIAGNOSIS — Z98.51 TUBAL LIGATION STATUS: Chronic | ICD-10-CM

## 2017-11-27 DIAGNOSIS — Z98.891 HISTORY OF UTERINE SCAR FROM PREVIOUS SURGERY: Chronic | ICD-10-CM

## 2017-11-27 DIAGNOSIS — Z87.81 PERSONAL HISTORY OF (HEALED) TRAUMATIC FRACTURE: Chronic | ICD-10-CM

## 2017-11-27 LAB
ANION GAP SERPL CALC-SCNC: 10 MMOL/L — SIGNIFICANT CHANGE UP (ref 5–17)
BUN SERPL-MCNC: 32 MG/DL — HIGH (ref 7–23)
CALCIUM SERPL-MCNC: 9.4 MG/DL — SIGNIFICANT CHANGE UP (ref 8.4–10.5)
CHLORIDE SERPL-SCNC: 107 MMOL/L — SIGNIFICANT CHANGE UP (ref 96–108)
CO2 SERPL-SCNC: 25 MMOL/L — SIGNIFICANT CHANGE UP (ref 22–31)
CREAT SERPL-MCNC: 0.76 MG/DL — SIGNIFICANT CHANGE UP (ref 0.5–1.3)
GLUCOSE BLDC GLUCOMTR-MCNC: 80 MG/DL — SIGNIFICANT CHANGE UP (ref 70–99)
GLUCOSE SERPL-MCNC: 82 MG/DL — SIGNIFICANT CHANGE UP (ref 70–99)
POTASSIUM SERPL-MCNC: 5.5 MMOL/L — HIGH (ref 3.5–5.3)
POTASSIUM SERPL-SCNC: 5.5 MMOL/L — HIGH (ref 3.5–5.3)
SODIUM SERPL-SCNC: 142 MMOL/L — SIGNIFICANT CHANGE UP (ref 135–145)

## 2017-11-27 PROCEDURE — 85027 COMPLETE CBC AUTOMATED: CPT

## 2017-11-27 PROCEDURE — 82962 GLUCOSE BLOOD TEST: CPT

## 2017-11-27 PROCEDURE — 80048 BASIC METABOLIC PNL TOTAL CA: CPT

## 2017-11-27 PROCEDURE — C1769: CPT

## 2017-11-27 PROCEDURE — 76000 FLUOROSCOPY <1 HR PHYS/QHP: CPT

## 2017-11-27 PROCEDURE — 87086 URINE CULTURE/COLONY COUNT: CPT

## 2017-11-27 PROCEDURE — 52351 CYSTOURETERO & OR PYELOSCOPE: CPT

## 2017-11-27 PROCEDURE — G0463: CPT

## 2017-11-27 PROCEDURE — 52351 CYSTOURETERO & OR PYELOSCOPE: CPT | Mod: LT

## 2017-11-27 PROCEDURE — 83036 HEMOGLOBIN GLYCOSYLATED A1C: CPT

## 2017-11-27 RX ORDER — SODIUM CHLORIDE 9 MG/ML
1000 INJECTION INTRAMUSCULAR; INTRAVENOUS; SUBCUTANEOUS
Qty: 0 | Refills: 0 | Status: DISCONTINUED | OUTPATIENT
Start: 2017-11-27 | End: 2017-12-12

## 2017-11-27 RX ORDER — HYDROMORPHONE HYDROCHLORIDE 2 MG/ML
0.5 INJECTION INTRAMUSCULAR; INTRAVENOUS; SUBCUTANEOUS
Qty: 0 | Refills: 0 | Status: DISCONTINUED | OUTPATIENT
Start: 2017-11-27 | End: 2017-11-27

## 2017-11-27 RX ORDER — ONDANSETRON 8 MG/1
4 TABLET, FILM COATED ORAL ONCE
Qty: 0 | Refills: 0 | Status: DISCONTINUED | OUTPATIENT
Start: 2017-11-27 | End: 2017-11-27

## 2017-11-27 RX ORDER — CEPHALEXIN 500 MG
1 CAPSULE ORAL
Qty: 6 | Refills: 0
Start: 2017-11-27 | End: 2017-11-30

## 2017-11-27 NOTE — BRIEF OPERATIVE NOTE - OPERATION/FINDINGS
Cystoscopy, Left retrograde pyelogram, Left Ureteroscopy, No evidence of abnormal ureteral masses or obvious strictures.

## 2017-11-27 NOTE — ASU DISCHARGE PLAN (ADULT/PEDIATRIC). - MEDICATION SUMMARY - MEDICATIONS TO TAKE
I will START or STAY ON the medications listed below when I get home from the hospital:    lisinopril 10 mg oral tablet  -- 1 tab(s) by mouth once a day  -- Indication: For HTN    HumaLOG 100 units/mL subcutaneous solution  --  subcutaneous 8-10 units before meals  -- Indication: For D2M    Basaglar KwikPen 100 units/mL subcutaneous solution  --  subcutaneous 14 unitsa once a day at bedtime  -- Indication: For D2M    atorvastatin 20 mg oral tablet  -- 1 tab(s) by mouth once a day (at bedtime) MDD:1  -- Indication: For HLD    metoprolol succinate 25 mg oral tablet, extended release  -- 1 tab(s) by mouth once a day  -- Indication: For Home    Keflex 500 mg oral capsule  -- 1 cap(s) by mouth 2 times a day   -- Finish all this medication unless otherwise directed by prescriber.    -- Indication: For Surgical Prophylaxis    Multiple Vitamins oral capsule  -- 1 cap(s) by mouth once a day  -- Indication: For Home

## 2017-11-28 ENCOUNTER — TRANSCRIPTION ENCOUNTER (OUTPATIENT)
Age: 52
End: 2017-11-28

## 2017-11-28 LAB — GLUCOSE BLDC GLUCOMTR-MCNC: 81 MG/DL — SIGNIFICANT CHANGE UP (ref 70–99)

## 2017-12-04 LAB
ANION GAP SERPL CALC-SCNC: 12 MMOL/L
BUN SERPL-MCNC: 33 MG/DL
CALCIUM SERPL-MCNC: 9.2 MG/DL
CHLORIDE SERPL-SCNC: 103 MMOL/L
CO2 SERPL-SCNC: 23 MMOL/L
CREAT SERPL-MCNC: 0.79 MG/DL
GLUCOSE SERPL-MCNC: 132 MG/DL
POTASSIUM SERPL-SCNC: 6.3 MMOL/L
SODIUM SERPL-SCNC: 138 MMOL/L

## 2017-12-12 ENCOUNTER — APPOINTMENT (OUTPATIENT)
Dept: UROLOGY | Facility: CLINIC | Age: 52
End: 2017-12-12
Payer: MEDICAID

## 2017-12-12 LAB
ANION GAP SERPL CALC-SCNC: 12 MMOL/L
BUN SERPL-MCNC: 29 MG/DL
CALCIUM SERPL-MCNC: 9.6 MG/DL
CHLORIDE SERPL-SCNC: 104 MMOL/L
CO2 SERPL-SCNC: 24 MMOL/L
CREAT SERPL-MCNC: 0.86 MG/DL
GLUCOSE SERPL-MCNC: 143 MG/DL
POTASSIUM SERPL-SCNC: 6 MMOL/L
SODIUM SERPL-SCNC: 140 MMOL/L

## 2017-12-12 PROCEDURE — 99213 OFFICE O/P EST LOW 20 MIN: CPT

## 2017-12-14 ENCOUNTER — APPOINTMENT (OUTPATIENT)
Dept: NEPHROLOGY | Facility: CLINIC | Age: 52
End: 2017-12-14

## 2017-12-26 ENCOUNTER — APPOINTMENT (OUTPATIENT)
Dept: NUCLEAR MEDICINE | Facility: HOSPITAL | Age: 52
End: 2017-12-26

## 2017-12-28 ENCOUNTER — APPOINTMENT (OUTPATIENT)
Dept: ENDOCRINOLOGY | Facility: CLINIC | Age: 52
End: 2017-12-28
Payer: MEDICAID

## 2017-12-28 VITALS
BODY MASS INDEX: 21.16 KG/M2 | SYSTOLIC BLOOD PRESSURE: 134 MMHG | HEIGHT: 62 IN | DIASTOLIC BLOOD PRESSURE: 60 MMHG | OXYGEN SATURATION: 98 % | HEART RATE: 91 BPM | WEIGHT: 115 LBS

## 2017-12-28 LAB
GLUCOSE BLDC GLUCOMTR-MCNC: 176
HBA1C MFR BLD HPLC: 8.3

## 2017-12-28 PROCEDURE — 99215 OFFICE O/P EST HI 40 MIN: CPT

## 2017-12-28 RX ORDER — CIPROFLOXACIN HYDROCHLORIDE 500 MG/1
500 TABLET, FILM COATED ORAL TWICE DAILY
Qty: 14 | Refills: 0 | Status: DISCONTINUED | COMMUNITY
Start: 2017-11-22 | End: 2017-12-28

## 2017-12-28 RX ORDER — SULFAMETHOXAZOLE AND TRIMETHOPRIM 800; 160 MG/1; MG/1
800-160 TABLET ORAL
Qty: 14 | Refills: 0 | Status: DISCONTINUED | COMMUNITY
Start: 2017-09-07 | End: 2017-12-28

## 2018-01-11 ENCOUNTER — APPOINTMENT (OUTPATIENT)
Dept: NEPHROLOGY | Facility: CLINIC | Age: 53
End: 2018-01-11
Payer: MEDICAID

## 2018-01-11 ENCOUNTER — TRANSCRIPTION ENCOUNTER (OUTPATIENT)
Age: 53
End: 2018-01-11

## 2018-01-11 VITALS — WEIGHT: 120 LBS | HEIGHT: 62 IN | BODY MASS INDEX: 22.08 KG/M2 | OXYGEN SATURATION: 100 % | HEART RATE: 96 BPM

## 2018-01-11 VITALS — SYSTOLIC BLOOD PRESSURE: 180 MMHG | DIASTOLIC BLOOD PRESSURE: 80 MMHG

## 2018-01-11 PROCEDURE — 99204 OFFICE O/P NEW MOD 45 MIN: CPT

## 2018-01-12 LAB
ALBUMIN SERPL ELPH-MCNC: 3.7 G/DL
ALDOSTERONE SERUM: 3.6 NG/DL
ANION GAP SERPL CALC-SCNC: 15 MMOL/L
APPEARANCE: CLEAR
BACTERIA: NEGATIVE
BILIRUBIN URINE: NEGATIVE
BLOOD URINE: NEGATIVE
BUN SERPL-MCNC: 38 MG/DL
CALCIUM SERPL-MCNC: 9.2 MG/DL
CHLORIDE SERPL-SCNC: 106 MMOL/L
CO2 SERPL-SCNC: 21 MMOL/L
COLOR: YELLOW
CREAT SERPL-MCNC: 0.71 MG/DL
GLUCOSE QUALITATIVE U: 250 MG/DL
GLUCOSE SERPL-MCNC: 165 MG/DL
HYALINE CASTS: 1 /LPF
KETONES URINE: NEGATIVE
LEUKOCYTE ESTERASE URINE: NEGATIVE
MICROSCOPIC-UA: NORMAL
NITRITE URINE: NEGATIVE
OSMOLALITY SERPL: 305 MOS/KG
OSMOLALITY UR: 618 MOS/KG
PH URINE: 5.5
PHOSPHATE SERPL-MCNC: 2.9 MG/DL
POTASSIUM SERPL-SCNC: 4.8 MMOL/L
POTASSIUM UR-SCNC: 38 MMOL/L
PROTEIN URINE: 100 MG/DL
RED BLOOD CELLS URINE: 1 /HPF
SODIUM SERPL-SCNC: 142 MMOL/L
SPECIFIC GRAVITY URINE: 1.02
SQUAMOUS EPITHELIAL CELLS: 5 /HPF
UROBILINOGEN URINE: NEGATIVE MG/DL
WHITE BLOOD CELLS URINE: 1 /HPF

## 2018-01-12 RX ORDER — AMLODIPINE BESYLATE 5 MG/1
5 TABLET ORAL DAILY
Qty: 30 | Refills: 0 | Status: DISCONTINUED | COMMUNITY
Start: 2018-01-11 | End: 2018-01-12

## 2018-01-15 LAB
CREAT SPEC-SCNC: 41 MG/DL
CREAT/PROT UR: 1.5 RATIO
PROT UR-MCNC: 63 MG/DL

## 2018-01-19 LAB — RENIN ACTIVITY, PLASMA: 0.91 NG/ML/HR

## 2018-04-30 ENCOUNTER — APPOINTMENT (OUTPATIENT)
Dept: ENDOCRINOLOGY | Facility: CLINIC | Age: 53
End: 2018-04-30

## 2018-05-05 ENCOUNTER — RX RENEWAL (OUTPATIENT)
Age: 53
End: 2018-05-05

## 2018-05-07 ENCOUNTER — RX RENEWAL (OUTPATIENT)
Age: 53
End: 2018-05-07

## 2018-06-05 ENCOUNTER — APPOINTMENT (OUTPATIENT)
Dept: ENDOCRINOLOGY | Facility: CLINIC | Age: 53
End: 2018-06-05
Payer: MEDICAID

## 2018-06-05 VITALS
HEART RATE: 86 BPM | OXYGEN SATURATION: 98 % | DIASTOLIC BLOOD PRESSURE: 80 MMHG | HEIGHT: 62 IN | WEIGHT: 128 LBS | BODY MASS INDEX: 23.55 KG/M2 | SYSTOLIC BLOOD PRESSURE: 142 MMHG

## 2018-06-05 LAB
GLUCOSE BLDC GLUCOMTR-MCNC: 97
HBA1C MFR BLD HPLC: 9

## 2018-06-05 PROCEDURE — 99214 OFFICE O/P EST MOD 30 MIN: CPT | Mod: 25

## 2018-06-05 PROCEDURE — 83036 HEMOGLOBIN GLYCOSYLATED A1C: CPT | Mod: QW

## 2018-06-06 LAB
25(OH)D3 SERPL-MCNC: 22.6 NG/ML
ALBUMIN SERPL ELPH-MCNC: 3.9 G/DL
ALP BLD-CCNC: 98 U/L
ALT SERPL-CCNC: 20 U/L
ANION GAP SERPL CALC-SCNC: 13 MMOL/L
AST SERPL-CCNC: 24 U/L
BILIRUB SERPL-MCNC: 0.3 MG/DL
BUN SERPL-MCNC: 39 MG/DL
CALCIUM SERPL-MCNC: 10.3 MG/DL
CHLORIDE SERPL-SCNC: 104 MMOL/L
CHOLEST SERPL-MCNC: 227 MG/DL
CHOLEST/HDLC SERPL: 2.6 RATIO
CO2 SERPL-SCNC: 24 MMOL/L
CREAT SERPL-MCNC: 0.9 MG/DL
GLUCOSE SERPL-MCNC: 96 MG/DL
HBA1C MFR BLD HPLC: 9 %
HDLC SERPL-MCNC: 89 MG/DL
LDLC SERPL CALC-MCNC: 119 MG/DL
POTASSIUM SERPL-SCNC: 5.8 MMOL/L
PROT SERPL-MCNC: 7.3 G/DL
SODIUM SERPL-SCNC: 141 MMOL/L
TRIGL SERPL-MCNC: 93 MG/DL

## 2018-06-18 ENCOUNTER — RX RENEWAL (OUTPATIENT)
Age: 53
End: 2018-06-18

## 2018-06-19 ENCOUNTER — RX RENEWAL (OUTPATIENT)
Age: 53
End: 2018-06-19

## 2018-07-03 ENCOUNTER — APPOINTMENT (OUTPATIENT)
Dept: ENDOCRINOLOGY | Facility: CLINIC | Age: 53
End: 2018-07-03
Payer: MEDICAID

## 2018-07-03 VITALS — HEIGHT: 62 IN | BODY MASS INDEX: 23 KG/M2 | WEIGHT: 125 LBS

## 2018-07-03 PROCEDURE — G0108 DIAB MANAGE TRN  PER INDIV: CPT

## 2018-07-05 ENCOUNTER — RX RENEWAL (OUTPATIENT)
Age: 53
End: 2018-07-05

## 2018-07-19 ENCOUNTER — OUTPATIENT (OUTPATIENT)
Dept: OUTPATIENT SERVICES | Facility: HOSPITAL | Age: 53
LOS: 1 days | End: 2018-07-19
Payer: MEDICAID

## 2018-07-19 ENCOUNTER — TRANSCRIPTION ENCOUNTER (OUTPATIENT)
Age: 53
End: 2018-07-19

## 2018-07-19 ENCOUNTER — APPOINTMENT (OUTPATIENT)
Dept: ULTRASOUND IMAGING | Facility: CLINIC | Age: 53
End: 2018-07-19
Payer: MEDICAID

## 2018-07-19 DIAGNOSIS — Z87.81 PERSONAL HISTORY OF (HEALED) TRAUMATIC FRACTURE: Chronic | ICD-10-CM

## 2018-07-19 DIAGNOSIS — Z98.891 HISTORY OF UTERINE SCAR FROM PREVIOUS SURGERY: Chronic | ICD-10-CM

## 2018-07-19 DIAGNOSIS — Z98.51 TUBAL LIGATION STATUS: Chronic | ICD-10-CM

## 2018-07-19 DIAGNOSIS — R10.9 UNSPECIFIED ABDOMINAL PAIN: ICD-10-CM

## 2018-07-19 PROCEDURE — 76770 US EXAM ABDO BACK WALL COMP: CPT

## 2018-07-19 PROCEDURE — 76770 US EXAM ABDO BACK WALL COMP: CPT | Mod: 26

## 2018-08-08 ENCOUNTER — OTHER (OUTPATIENT)
Age: 53
End: 2018-08-08

## 2018-08-08 ENCOUNTER — APPOINTMENT (OUTPATIENT)
Dept: UROLOGY | Facility: CLINIC | Age: 53
End: 2018-08-08
Payer: MEDICAID

## 2018-08-08 DIAGNOSIS — N13.30 UNSPECIFIED HYDRONEPHROSIS: ICD-10-CM

## 2018-08-08 PROCEDURE — 99214 OFFICE O/P EST MOD 30 MIN: CPT

## 2018-08-13 ENCOUNTER — FORM ENCOUNTER (OUTPATIENT)
Age: 53
End: 2018-08-13

## 2018-08-13 LAB — BACTERIA UR CULT: NORMAL

## 2018-08-14 ENCOUNTER — APPOINTMENT (OUTPATIENT)
Dept: NUCLEAR MEDICINE | Facility: HOSPITAL | Age: 53
End: 2018-08-14
Payer: MEDICAID

## 2018-08-14 ENCOUNTER — OUTPATIENT (OUTPATIENT)
Dept: OUTPATIENT SERVICES | Facility: HOSPITAL | Age: 53
LOS: 1 days | End: 2018-08-14
Payer: MEDICAID

## 2018-08-14 DIAGNOSIS — N13.30 UNSPECIFIED HYDRONEPHROSIS: ICD-10-CM

## 2018-08-14 DIAGNOSIS — Z87.81 PERSONAL HISTORY OF (HEALED) TRAUMATIC FRACTURE: Chronic | ICD-10-CM

## 2018-08-14 DIAGNOSIS — Z98.891 HISTORY OF UTERINE SCAR FROM PREVIOUS SURGERY: Chronic | ICD-10-CM

## 2018-08-14 DIAGNOSIS — Z98.51 TUBAL LIGATION STATUS: Chronic | ICD-10-CM

## 2018-08-14 PROCEDURE — 51702 INSERT TEMP BLADDER CATH: CPT

## 2018-08-14 PROCEDURE — A9562: CPT

## 2018-08-14 PROCEDURE — 78708 K FLOW/FUNCT IMAGE W/DRUG: CPT | Mod: 26

## 2018-08-14 PROCEDURE — 78708 K FLOW/FUNCT IMAGE W/DRUG: CPT

## 2018-08-29 RX ORDER — INSULIN GLARGINE 100 [IU]/ML
0 INJECTION, SOLUTION SUBCUTANEOUS
Qty: 0 | Refills: 0 | COMMUNITY

## 2018-09-27 ENCOUNTER — RX RENEWAL (OUTPATIENT)
Age: 53
End: 2018-09-27

## 2018-10-04 PROBLEM — F41.9 ANXIETY DISORDER, UNSPECIFIED: Chronic | Status: ACTIVE | Noted: 2017-09-07

## 2018-10-04 PROBLEM — E78.5 HYPERLIPIDEMIA, UNSPECIFIED: Chronic | Status: ACTIVE | Noted: 2017-09-07

## 2018-10-04 PROBLEM — I10 ESSENTIAL (PRIMARY) HYPERTENSION: Chronic | Status: ACTIVE | Noted: 2017-09-07

## 2018-10-04 PROBLEM — N39.0 URINARY TRACT INFECTION, SITE NOT SPECIFIED: Chronic | Status: ACTIVE | Noted: 2017-09-07

## 2018-10-04 PROBLEM — N13.30 UNSPECIFIED HYDRONEPHROSIS: Chronic | Status: ACTIVE | Noted: 2017-09-07

## 2018-10-08 ENCOUNTER — APPOINTMENT (OUTPATIENT)
Dept: ENDOCRINOLOGY | Facility: CLINIC | Age: 53
End: 2018-10-08

## 2018-10-23 NOTE — ED PROVIDER NOTE - NSCAREINITIATED _GEN_ER
Patient took off monitor, discontinued IV and went into shower, with daughters assist.  Kathrin Jenkins Jameson Rizo(Resident)

## 2018-11-07 ENCOUNTER — RESULT REVIEW (OUTPATIENT)
Age: 53
End: 2018-11-07

## 2018-11-07 ENCOUNTER — RX RENEWAL (OUTPATIENT)
Age: 53
End: 2018-11-07

## 2018-11-07 ENCOUNTER — APPOINTMENT (OUTPATIENT)
Dept: ENDOCRINOLOGY | Facility: CLINIC | Age: 53
End: 2018-11-07
Payer: SELF-PAY

## 2018-11-07 VITALS
HEIGHT: 62 IN | SYSTOLIC BLOOD PRESSURE: 120 MMHG | WEIGHT: 125 LBS | BODY MASS INDEX: 23 KG/M2 | DIASTOLIC BLOOD PRESSURE: 80 MMHG

## 2018-11-07 LAB — HBA1C MFR BLD HPLC: 8.3

## 2018-11-07 PROCEDURE — 83036 HEMOGLOBIN GLYCOSYLATED A1C: CPT | Mod: QW

## 2018-11-07 PROCEDURE — 95249 CONT GLUC MNTR PT PROV EQP: CPT

## 2018-11-07 PROCEDURE — G0108 DIAB MANAGE TRN  PER INDIV: CPT

## 2019-01-02 ENCOUNTER — RX RENEWAL (OUTPATIENT)
Age: 54
End: 2019-01-02

## 2019-01-02 RX ORDER — LANCETS 28 GAUGE
EACH MISCELLANEOUS
Qty: 1 | Refills: 3 | Status: ACTIVE | COMMUNITY
Start: 2018-11-07 | End: 1900-01-01

## 2019-01-15 ENCOUNTER — CLINICAL ADVICE (OUTPATIENT)
Age: 54
End: 2019-01-15

## 2019-01-17 ENCOUNTER — RX RENEWAL (OUTPATIENT)
Age: 54
End: 2019-01-17

## 2019-01-17 ENCOUNTER — CLINICAL ADVICE (OUTPATIENT)
Age: 54
End: 2019-01-17

## 2019-01-18 ENCOUNTER — RX RENEWAL (OUTPATIENT)
Age: 54
End: 2019-01-18

## 2019-01-18 RX ORDER — INSULIN ASPART 100 [IU]/ML
100 INJECTION, SOLUTION INTRAVENOUS; SUBCUTANEOUS
Qty: 2 | Refills: 0 | Status: DISCONTINUED | COMMUNITY
Start: 2019-01-15 | End: 2019-01-18

## 2019-01-18 RX ORDER — INSULIN LISPRO 100 [IU]/ML
100 INJECTION, SOLUTION INTRAVENOUS; SUBCUTANEOUS
Qty: 2 | Refills: 3 | Status: DISCONTINUED | COMMUNITY
Start: 2019-01-17 | End: 2019-01-18

## 2019-01-22 ENCOUNTER — APPOINTMENT (OUTPATIENT)
Dept: ENDOCRINOLOGY | Facility: CLINIC | Age: 54
End: 2019-01-22
Payer: MEDICAID

## 2019-01-22 PROCEDURE — 82962 GLUCOSE BLOOD TEST: CPT

## 2019-01-22 PROCEDURE — 99214 OFFICE O/P EST MOD 30 MIN: CPT | Mod: 25

## 2019-01-22 PROCEDURE — 83036 HEMOGLOBIN GLYCOSYLATED A1C: CPT | Mod: QW

## 2019-01-22 NOTE — ASSESSMENT
[FreeTextEntry1] : Patient is a 54 yo woman with type 1 diabetes, HLD and HTN\par \par 1. Type 1 diabetes\par -patient's DEXCOM download reviewed.  She is having less hypoglycemia and some days sugars are well controlled.  Other days, sugars can be very high especially when she is stressed out or eats dinner after 7 PM\par -for now, increase basalgar by 1 unit from 9 to 10 units.  She gets lower sugars around afternoon so decrease breakfast humalog to I:C ratio of 1:17 instead of 1:15.  If she eats dinner after 7 PM add one unit of humalog\par -continue with DEXCOM\par -up to date with ophthalmology\par -up to date with podiatry\par -call with hypo/hyperglycemic excursions\par \par 2. HLD\par -statin\par \par 3. HTN\par -BP at goal\par -repeat CMP as she had hyperkalemia in the past.  IF still high, may refer to nephrology or PCP for further work up [Carbohydrate Consistent Diet] : carbohydrate consistent diet [Hypoglycemia Management] : hypoglycemia management [Diabetes Foot Care] : diabetes foot care [Long Term Vascular Complications] : long term vascular complications of diabetes [Importance of Diet and Exercise] : importance of diet and exercise to improve glycemic control, achieve weight loss and improve cardiovascular health [Action and use of Insulin] : action and use of short and long-acting insulin [Self Monitoring of Blood Glucose] : self monitoring of blood glucose [Insulin Self-Administration] : insulin self-administration [Injection Technique, Storage, Sharps Disposal] : injection technique, storage, and sharps disposal [Retinopathy Screening] : Patient was referred to ophthalmology for retinopathy screening

## 2019-01-22 NOTE — HISTORY OF PRESENT ILLNESS
[FreeTextEntry1] : 52 yo woman with type 1 DM, diagnosed at age 15 here for follow up. \par She also has hypertension and hyperlipemia.\par \par She has long standing type 1 diabetes since age 15 years\par Her current regimen is Basalgar 9 units QHS, IC ratio of 1:15 (Humalog 4-5 before lunch, then about 8 units with dinner TID), ISF 1:70, target 120.  \par She reported that she had been self-managing her type 1 DM pretty much since her diagnosis. \par Checks sugars 2-3 x day. \par AM: 80-90s, less hypoglycemia. The other day, patient gets low around 12 PM-1PM to 60-70s but with DEXCOM alerts she has been doing well.  \par Before dinner/after lunch: sometimes 140\par Breakfast: now on a vegetarian diet\par Lunch: often skips, has Caesar salad\par Snacks: popcorn, rhonda chips, rhonda and hummus, skinny pop\par Dinner: cauliflower rice with beans.  Sugars peak when she eats after 7 PM before 7 PM sugars are well controlled\par POCT is 8.3%\par Dilated eye exam: appointment coming up this month, hx of + retinopathy had laser\par No neuropathy, followed with Dr. Quinonez last month\par Microvascular: retinopathy, retinal hemorrhage, but no neuropathy and no nephropathy .\par Macrovascular complication: no prior history of heart attack, stroke and PVD. \par

## 2019-01-22 NOTE — REVIEW OF SYSTEMS
[Fatigue] : fatigue [Decreased Appetite] : appetite not decreased [Neck Pain] : no neck pain [Nasal Congestion] : no nasal congestion [Chest Pain] : no chest pain [Palpitations] : no palpitations [Heart Rate Is Slow] : the heart rate was not slow [Heart Rate Is Fast] : the heart rate was not fast [Nausea] : no nausea [Vomiting] : no vomiting was observed [Constipation] : no constipation [Diarrhea] : no diarrhea [Depression] : no depression [Anxiety] : no anxiety [Stress] : stress [Cold Intolerance] : cold tolerant [Heat Intolerance] : heat tolerant [Negative] : Psychiatric [All other systems negative] : All other systems negative [FreeTextEntry2] : Cold recently

## 2019-01-22 NOTE — PHYSICAL EXAM
[Alert] : alert [No Acute Distress] : no acute distress [Well Nourished] : well nourished [Well Developed] : well developed [No Proptosis] : no proptosis [Normal Hearing] : hearing was normal [No Respiratory Distress] : no respiratory distress [Normal Rate and Effort] : normal respiratory rhythm and effort [No Accessory Muscle Use] : no accessory muscle use [Clear to Auscultation] : lungs were clear to auscultation bilaterally [Normal Rate] : heart rate was normal  [Normal S1, S2] : normal S1 and S2 [Regular Rhythm] : with a regular rhythm [Normal Bowel Sounds] : normal bowel sounds [Not Tender] : non-tender [Soft] : abdomen soft [Normal Gait] : normal gait [No Joint Swelling] : no joint swelling seen [Foot Ulcers] : no foot ulcers [Right Foot Was Examined] : right foot ~C was examined [Left Foot Was Examined] : left foot ~C was examined [Swelling] : not swollen [Erythema] : not erythematous [Normal] : normal [2+] : 2+ in the dorsalis pedis [No Motor Deficits] : the motor exam was normal [No Tremors] : no tremors [Normal Insight/Judgement] : insight and judgment were intact [Normal Affect] : the affect was normal [Normal Mood] : the mood was normal

## 2019-01-23 ENCOUNTER — RX RENEWAL (OUTPATIENT)
Age: 54
End: 2019-01-23

## 2019-01-23 LAB
25(OH)D3 SERPL-MCNC: 21.3 NG/ML
ALBUMIN SERPL ELPH-MCNC: 4 G/DL
ALP BLD-CCNC: 99 U/L
ALT SERPL-CCNC: 23 U/L
ANION GAP SERPL CALC-SCNC: 10 MMOL/L
AST SERPL-CCNC: 18 U/L
BILIRUB SERPL-MCNC: 0.2 MG/DL
BUN SERPL-MCNC: 40 MG/DL
CALCIUM SERPL-MCNC: 10 MG/DL
CHLORIDE SERPL-SCNC: 108 MMOL/L
CHOLEST SERPL-MCNC: 223 MG/DL
CHOLEST/HDLC SERPL: 3.2 RATIO
CO2 SERPL-SCNC: 24 MMOL/L
CREAT SERPL-MCNC: 0.76 MG/DL
GLUCOSE SERPL-MCNC: 191 MG/DL
HBA1C MFR BLD HPLC: 7.8 %
HDLC SERPL-MCNC: 70 MG/DL
LDLC SERPL CALC-MCNC: 135 MG/DL
POTASSIUM SERPL-SCNC: 4.2 MMOL/L
PROT SERPL-MCNC: 7.4 G/DL
SODIUM SERPL-SCNC: 142 MMOL/L
TRIGL SERPL-MCNC: 88 MG/DL

## 2019-08-13 ENCOUNTER — APPOINTMENT (OUTPATIENT)
Dept: ENDOCRINOLOGY | Facility: CLINIC | Age: 54
End: 2019-08-13
Payer: MEDICAID

## 2019-08-13 VITALS
WEIGHT: 141 LBS | BODY MASS INDEX: 25.95 KG/M2 | OXYGEN SATURATION: 98 % | HEIGHT: 62 IN | DIASTOLIC BLOOD PRESSURE: 76 MMHG | SYSTOLIC BLOOD PRESSURE: 140 MMHG | HEART RATE: 104 BPM

## 2019-08-13 LAB
GLUCOSE BLDC GLUCOMTR-MCNC: 202
HBA1C MFR BLD HPLC: 7.6

## 2019-08-13 PROCEDURE — 99214 OFFICE O/P EST MOD 30 MIN: CPT | Mod: 25

## 2019-08-13 PROCEDURE — 82962 GLUCOSE BLOOD TEST: CPT

## 2019-08-13 PROCEDURE — 83036 HEMOGLOBIN GLYCOSYLATED A1C: CPT | Mod: QW

## 2019-08-13 NOTE — PHYSICAL EXAM
[No Acute Distress] : no acute distress [Alert] : alert [Well Developed] : well developed [Well Nourished] : well nourished [EOMI] : extra ocular movement intact [No Proptosis] : no proptosis [Normal Hearing] : hearing was normal [Normal Rate and Effort] : normal respiratory rhythm and effort [No Respiratory Distress] : no respiratory distress [No Accessory Muscle Use] : no accessory muscle use [Normal Rate] : heart rate was normal  [Clear to Auscultation] : lungs were clear to auscultation bilaterally [Regular Rhythm] : with a regular rhythm [Normal S1, S2] : normal S1 and S2 [Normal Bowel Sounds] : normal bowel sounds [Not Tender] : non-tender [Soft] : abdomen soft [No Joint Swelling] : no joint swelling seen [Normal Gait] : normal gait [Normal Strength/Tone] : muscle strength and tone were normal [Normal Reflexes] : deep tendon reflexes were 2+ and symmetric [No Motor Deficits] : the motor exam was normal [No Tremors] : no tremors [Normal Insight/Judgement] : insight and judgment were intact [Normal Affect] : the affect was normal [Normal Mood] : the mood was normal

## 2019-08-13 NOTE — REVIEW OF SYSTEMS
[Negative] : ENT [All other systems negative] : All other systems negative [Fatigue] : no fatigue [Decreased Appetite] : appetite not decreased [Visual Field Defect] : no visual field defect [Blurry Vision] : no blurred vision [Dysphagia] : no dysphagia [Dysphonia] : no dysphonia [Chest Pain] : no chest pain [Heart Rate Is Slow] : the heart rate was not slow [Palpitations] : no palpitations [Shortness Of Breath] : no shortness of breath [Heart Rate Is Fast] : the heart rate was not fast [Wheezing] : no wheezing was heard [SOB on Exertion] : no shortness of breath during exertion [Cough] : no cough [Nausea] : no nausea [Vomiting] : no vomiting was observed [Diarrhea] : no diarrhea [Constipation] : no constipation [Joint Pain] : no joint pain [Joint Stiffness] : no joint stiffness [Depression] : no depression [Cold Intolerance] : cold tolerant [Anxiety] : no anxiety [Heat Intolerance] : heat tolerant [Easy Bleeding] : no ~M tendency for easy bleeding [Easy Bruising] : no tendency for easy bruising

## 2019-08-13 NOTE — ASSESSMENT
[FreeTextEntry1] : Patient is a 55 yo woman with type 1 diabetes, HLD and HTN\par \par 1. Type 1 diabetes\par -patient's DEXCOM download reviewed. She is having 0% hypoglycemia and some days sugars are well controlled.\par -patient's FS patterns do not have significant variation but is generally high. Increase basaglar from 10 to 12 units at bedtime, continue with I:C of 15, ISF of 70 and target of 120\par -up to date with ophthalmology\par -up to date with podiatry\par -call with hypo/hyperglycemic excursions\par -check serum A1c, CMP, microalbumin/creatinine today\par \par 2. HLD\par -statin\par \par 3. HTN\par -BP at goal\par -repeat CMP as she had hyperkalemia in the past. IF still high, may refer to nephrology or PCP for further work up.\par -cardiologist/PCP stopped all BP medicines and told patient she has white coat HTN\par -BP Goal < 140.90\par \par Follow up in 4 months. Call with hypo/hyperglycemic excursions [Carbohydrate Consistent Diet] : carbohydrate consistent diet [Hypoglycemia Management] : hypoglycemia management [Long Term Vascular Complications] : long term vascular complications of diabetes [Importance of Diet and Exercise] : importance of diet and exercise to improve glycemic control, achieve weight loss and improve cardiovascular health [Self Monitoring of Blood Glucose] : self monitoring of blood glucose [Action and use of Insulin] : action and use of short and long-acting insulin [Insulin Self-Administration] : insulin self-administration [Injection Technique, Storage, Sharps Disposal] : injection technique, storage, and sharps disposal [Retinopathy Screening] : Patient was referred to ophthalmology for retinopathy screening

## 2019-08-13 NOTE — HISTORY OF PRESENT ILLNESS
[FreeTextEntry1] : 55 yo woman with type 1 DM, diagnosed at age 15 here for follow up. \par She also has hypertension and hyperlipemia.\par \par Her current regimen is Basalgar 10 units QHS, IC ratio of 1:15 (breakfast/lunch) and a "little more for dinner." On average, she takes HL 4-5 before lunch, then about 6-7 units with dinner TID), ISF 1:70, target 120. \par She reported that she had been self-managing her type 1 DM pretty much since her diagnosis. \par Checks sugars 2-3 x day. DEXCOM\par One day it's "really really good and next day it's in the 200's"  Rare 300s, went camping last week and lowest value was 70s.  Does not happen regularly\par Before dinner/after lunch: sometimes 140\par Breakfast: oatmeal with water and cinnamon + vanilla extract\par Lunch: often skips, has Caesar salad; hummus\par Snacks: popcorn, rhonda chips, rhonda and hummus, skinny pop\par Dinner: cauliflower rice with beans. [employer] burger, octavio only with sauteed spinach; eats at a [employer] on Thursday\par January 23, 2019\par A1c 7.8%\par Dilated eye exam: March 2019, hx of + retinopathy had laser; hx of left eye blindness\par No neuropathy, followed with Dr. Quinonez last month\par Microvascular: retinopathy, retinal hemorrhage, but no neuropathy and no nephropathy .\par Macrovascular complication: no prior history of heart attack, stroke and PVD. \par \par HLD\par  on atorvastatin \par \par HTN\par Patient is followed by Dr. Miguel Viveros who stopped all BP medicines and told her she had white coat HTN

## 2019-08-14 LAB
25(OH)D3 SERPL-MCNC: 18.9 NG/ML
ALBUMIN SERPL ELPH-MCNC: 4.1 G/DL
ALP BLD-CCNC: 109 U/L
ALT SERPL-CCNC: 26 U/L
ANION GAP SERPL CALC-SCNC: 12 MMOL/L
AST SERPL-CCNC: 21 U/L
BILIRUB SERPL-MCNC: 0.2 MG/DL
BUN SERPL-MCNC: 37 MG/DL
CALCIUM SERPL-MCNC: 10.2 MG/DL
CHLORIDE SERPL-SCNC: 106 MMOL/L
CHOLEST SERPL-MCNC: 249 MG/DL
CHOLEST/HDLC SERPL: 3.7 RATIO
CO2 SERPL-SCNC: 25 MMOL/L
CREAT SERPL-MCNC: 0.82 MG/DL
CREAT SPEC-SCNC: 141 MG/DL
ESTIMATED AVERAGE GLUCOSE: 180 MG/DL
GLUCOSE SERPL-MCNC: 185 MG/DL
HBA1C MFR BLD HPLC: 7.9 %
HDLC SERPL-MCNC: 67 MG/DL
LDLC SERPL CALC-MCNC: 158 MG/DL
MICROALBUMIN 24H UR DL<=1MG/L-MCNC: 27.8 MG/DL
MICROALBUMIN/CREAT 24H UR-RTO: 197 MG/G
POTASSIUM SERPL-SCNC: 4.8 MMOL/L
PROT SERPL-MCNC: 7.3 G/DL
SODIUM SERPL-SCNC: 143 MMOL/L
T4 FREE SERPL-MCNC: 1 NG/DL
TRIGL SERPL-MCNC: 118 MG/DL
TSH SERPL-ACNC: 2.09 UIU/ML

## 2019-12-05 NOTE — ED ADULT TRIAGE NOTE - MEANS OF ARRIVAL
H&P reviewed  After examining the patient I find no changes in the patients condition since the H&P had been written      Vitals:    12/05/19 0811   BP: 119/69   Pulse: 67   Resp: 18   Temp: 98 6 °F (37 °C)   SpO2: 93% ambulatory

## 2020-05-08 ENCOUNTER — APPOINTMENT (OUTPATIENT)
Dept: ENDOCRINOLOGY | Facility: CLINIC | Age: 55
End: 2020-05-08
Payer: MEDICAID

## 2020-05-08 VITALS
OXYGEN SATURATION: 98 % | HEART RATE: 94 BPM | SYSTOLIC BLOOD PRESSURE: 130 MMHG | HEIGHT: 62 IN | WEIGHT: 139 LBS | DIASTOLIC BLOOD PRESSURE: 80 MMHG | BODY MASS INDEX: 25.58 KG/M2

## 2020-05-08 VITALS — TEMPERATURE: 98.3 F

## 2020-05-08 LAB
GLUCOSE BLDC GLUCOMTR-MCNC: 123
HBA1C MFR BLD HPLC: 7.1

## 2020-05-08 PROCEDURE — 99214 OFFICE O/P EST MOD 30 MIN: CPT | Mod: 25

## 2020-05-08 PROCEDURE — 82962 GLUCOSE BLOOD TEST: CPT

## 2020-05-08 PROCEDURE — 83036 HEMOGLOBIN GLYCOSYLATED A1C: CPT | Mod: QW

## 2020-05-08 NOTE — PHYSICAL EXAM
[Alert] : alert [Well Nourished] : well nourished [Healthy Appearance] : healthy appearance [Normal Hearing] : hearing was normal [No Respiratory Distress] : no respiratory distress [No Accessory Muscle Use] : no accessory muscle use [Normal Rate and Effort] : normal respiratory rate and effort [Normal Rate] : heart rate was normal [No Stigmata of Cushings Syndrome] : no stigmata of Cushings Syndrome [Normal Gait] : normal gait [No Motor Deficits] : the motor exam was normal [Normal Affect] : the affect was normal [Normal Insight/Judgement] : insight and judgment were intact [Normal Mood] : the mood was normal

## 2020-05-08 NOTE — ASSESSMENT
[FreeTextEntry1] : Patient is a 56 yo woman with type 1 diabetes, HTN and HLD\par \par 1. Type 1 diabetes\par -patient with more frequent episodes of hypoglycemia. This is likely due to less eating out at restaurants and more physical activity/exercise at home\par -for now, continue Basaglar 12 units daily\par -continue with 1:15 1:C ratio for breakfast and dinner but change to 1:18 for lunch as the hypoglycemia usually occurs around 3 PM\par -the CGM data was reviewed today\par -POCT A1c was 7.1%, confirm with serum\par -check labs\par -UTD with eye exam and podiatry\par -glucagon refill provided\par -she is interested in Omnipod. If she would like to process with insulin pump therapy, schedule CDE visit. States [facility] does not cover CDE visit so she will think about the out of pocket costs\par \par 2. HTN\par -BP goal < 140/90\par \par 3. HLD\par -statin\par \par Follow up in 4-5 months, call with hypo/hyperglycemic excursions [Carbohydrate Consistent Diet] : carbohydrate consistent diet [Importance of Diet and Exercise] : importance of diet and exercise to improve glycemic control, achieve weight loss and improve cardiovascular health [Exercise/Effect on Glucose] : exercise/effect on glucose [Action and use of Insulin] : action and use of short and long-acting insulin [Hypoglycemia Management] : hypoglycemia management [Injection Technique, Storage, Sharps Disposal] : injection technique, storage, and sharps disposal [Self Monitoring of Blood Glucose] : self monitoring of blood glucose [Insulin Self-Administration] : insulin self-administration [Retinopathy Screening] : Patient was referred to ophthalmology for retinopathy screening

## 2020-05-08 NOTE — REVIEW OF SYSTEMS
[Fatigue] : no fatigue [Decreased Appetite] : appetite not decreased [Chest Pain] : no chest pain [Slow Heart Rate] : heart rate is not slow [Palpitations] : no palpitations [Fast Heart Rate] : heart rate is not fast [Nausea] : no nausea [Constipation] : no constipation [Vomiting] : no vomiting [Diarrhea] : no diarrhea [Headaches] : no headaches [Tremors] : no tremors

## 2020-05-08 NOTE — HISTORY OF PRESENT ILLNESS
[FreeTextEntry1] : 56 yo woman with type 1 DM, diagnosed at age 15 here for follow up. \par She also has hypertension and hyperlipemia.\par \par Her current regimen is Basalgar 12 units QHS, IC ratio of 1:15 (breakfast/lunch) and a "little more for dinner." \par On average, she takes HL 4-5 before lunch, then about 6-7 units with dinner TID), ISF 1:70, target 120. \par Checks sugars 2-3 x day. \par AM: 100s, today had a sugar of 66. Last two days, sugars are lower.  She is walking on the treadmill more and doing work outs at home. \par Has a new home gym\par Afternoon: mid 100s\par Dinner: numbers are in the 100s, highest values in the 150-160, rare 180\par Attempted vegan and vegetarian diets but didn't work\par Due to COVID 19, less eating out now. Prior to social isolation, would eat out at restaurants several times  a week\par Breakfast: oatmeal with water and cinnamon + vanilla extract\par Lunch: often skips, has Caesar salad; hummus\par Snacks: less snacking\par Dinner: chicken and vegetables, mashed cauliflower\par Dilated eye exam: February 2020, hx of + retinopathy had laser; hx of left eye blindness\par No neuropathy, followed with Dr. Quinonez last visit January 2020\par \par HLD\par  on atorvastatin \par \par HTN\par Patient is followed by Dr. Miguel Viveros who stopped all BP medicines and told her she had white coat HTN \par

## 2020-05-13 LAB
25(OH)D3 SERPL-MCNC: 24.4 NG/ML
ALBUMIN SERPL ELPH-MCNC: 4.5 G/DL
ALP BLD-CCNC: 103 U/L
ALT SERPL-CCNC: 16 U/L
ANION GAP SERPL CALC-SCNC: 14 MMOL/L
AST SERPL-CCNC: 20 U/L
BILIRUB SERPL-MCNC: 0.2 MG/DL
BUN SERPL-MCNC: 53 MG/DL
CALCIUM SERPL-MCNC: 10.3 MG/DL
CHLORIDE SERPL-SCNC: 106 MMOL/L
CHOLEST SERPL-MCNC: 222 MG/DL
CHOLEST/HDLC SERPL: 3.6 RATIO
CO2 SERPL-SCNC: 22 MMOL/L
CREAT SERPL-MCNC: 0.85 MG/DL
ESTIMATED AVERAGE GLUCOSE: 163 MG/DL
GLUCOSE SERPL-MCNC: 105 MG/DL
HBA1C MFR BLD HPLC: 7.3 %
HDLC SERPL-MCNC: 62 MG/DL
LDLC SERPL CALC-MCNC: 137 MG/DL
POTASSIUM SERPL-SCNC: 5.5 MMOL/L
PROT SERPL-MCNC: 7.6 G/DL
SODIUM SERPL-SCNC: 142 MMOL/L
TRIGL SERPL-MCNC: 114 MG/DL

## 2020-06-15 ENCOUNTER — APPOINTMENT (OUTPATIENT)
Dept: NEPHROLOGY | Facility: CLINIC | Age: 55
End: 2020-06-15
Payer: MEDICAID

## 2020-06-15 VITALS
DIASTOLIC BLOOD PRESSURE: 81 MMHG | HEART RATE: 91 BPM | WEIGHT: 140 LBS | BODY MASS INDEX: 25.61 KG/M2 | OXYGEN SATURATION: 91 % | SYSTOLIC BLOOD PRESSURE: 188 MMHG

## 2020-06-15 VITALS — SYSTOLIC BLOOD PRESSURE: 160 MMHG | DIASTOLIC BLOOD PRESSURE: 70 MMHG

## 2020-06-15 DIAGNOSIS — E87.5 HYPERKALEMIA: ICD-10-CM

## 2020-06-15 PROCEDURE — 99213 OFFICE O/P EST LOW 20 MIN: CPT

## 2020-06-15 RX ORDER — TIMOLOL MALEATE 5 MG/ML
0.5 SOLUTION OPHTHALMIC
Qty: 5 | Refills: 0 | Status: ACTIVE | COMMUNITY
Start: 2020-04-29

## 2020-06-15 NOTE — ASSESSMENT
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  I asked the patient to follow a low potassium diet and I will repeat blood work today.  We reviewed options today for management of hyperkalemia including BP pills (HCTZ) or lokelma.  She is opting for non medial approach with dietary restriction.  \par \par Hypertension -- White coat hypertension documented previously with patient reporting episodes of hypotension at home.  Will order renal doppler.  \par \par Proteinuria -- likely diabetic no ACE I now given hyper K.  \par \par RTO six months or sooner if indicated

## 2020-06-15 NOTE — PHYSICAL EXAM
[General Appearance - Alert] : alert [General Appearance - In No Acute Distress] : in no acute distress [General Appearance - Well Nourished] : well nourished [General Appearance - Well Developed] : well developed [Sclera] : the sclera and conjunctiva were normal [Hearing Threshold Finger Rub Not [place]] : hearing was normal [Oropharynx] : the oropharynx was normal [Neck Appearance] : the appearance of the neck was normal [Neck Cervical Mass (___cm)] : no neck mass was observed [Jugular Venous Distention Increased] : there was no jugular-venous distention [Respiration, Rhythm And Depth] : normal respiratory rhythm and effort [Auscultation Breath Sounds / Voice Sounds] : lungs were clear to auscultation bilaterally [Exaggerated Use Of Accessory Muscles For Inspiration] : no accessory muscle use [Heart Sounds] : normal S1 and S2 [Murmurs] : no murmurs [Heart Sounds Gallop] : no gallops [Heart Sounds Pericardial Friction Rub] : no pericardial rub [Abdomen Soft] : soft [Edema] : there was no peripheral edema [Abdomen Tenderness] : non-tender [] : no hepato-splenomegaly [Abdomen Mass (___ Cm)] : no abdominal mass palpated [Abnormal Walk] : normal gait [Oriented To Time, Place, And Person] : oriented to person, place, and time [Impaired Insight] : insight and judgment were intact [Affect] : the affect was normal [Mood] : the mood was normal [FreeTextEntry1] : dentures

## 2020-06-15 NOTE — HISTORY OF PRESENT ILLNESS
[FreeTextEntry1] : Today I had the pleasure of meeting Claire Eller who is a 52 years old type I diabetic and mother of 5 children.  She is referred to me today for the evaluation of hyperkalemia.  The patient says that she was diagnosed with type I diabetes at age 15.  She has never had DKA.  She reports being blind in her left eye due to diabetes.  She has not had diabetic neuropathy, nor has she had diabetic gastroparesis.  Her surgical history is significant for 5 c sections.  A few weeks ago she was noted to have an elevated potassium in the setting of lisinopril (which she takes for proteinuria and hypertension).  On review of dietary history she has at least one banana every day, eats lots of potatoes, but does not like citrus fruits.  Recently she has been eating lots of tomatoes.  \par \par 6/15/20 -- It has been about two years since our initial meeting.  Claire continues to run high normal to high potassium.  She reports that she tries to restrict her diet but has a weakness for certain foods like tomatoes.  She reported to me that her BP sometimes drops too low at home and that her cardiologist recently stopped her medications.

## 2020-06-15 NOTE — REVIEW OF SYSTEMS
[Constipation] : constipation [Fever] : no fever [Chills] : no chills [Feeling Poorly] : not feeling poorly [Feeling Tired] : not feeling tired [Eyesight Problems] : no eyesight problems [Nosebleeds] : no nosebleeds [Chest Pain] : no chest pain [Shortness Of Breath] : no shortness of breath [Wheezing] : no wheezing [SOB on Exertion] : no shortness of breath during exertion [Cough] : no cough [Abdominal Pain] : no abdominal pain [Dysuria] : no dysuria [Arthralgias] : no arthralgias [Joint Pain] : no joint pain [Dizziness] : no dizziness [Fainting] : no fainting [Easy Bleeding] : no tendency for easy bleeding [Easy Bruising] : no tendency for easy bruising

## 2020-06-16 LAB
ALBUMIN SERPL ELPH-MCNC: 4.4 G/DL
ANION GAP SERPL CALC-SCNC: 13 MMOL/L
APPEARANCE: ABNORMAL
BACTERIA: ABNORMAL
BILIRUBIN URINE: NEGATIVE
BLOOD URINE: ABNORMAL
BUN SERPL-MCNC: 43 MG/DL
CALCIUM SERPL-MCNC: 9.9 MG/DL
CHLORIDE ?TM UR-SCNC: 56 MMOL/L
CHLORIDE SERPL-SCNC: 107 MMOL/L
CO2 SERPL-SCNC: 24 MMOL/L
COLOR: NORMAL
CREAT SERPL-MCNC: 0.82 MG/DL
CREAT SPEC-SCNC: 87 MG/DL
CREAT/PROT UR: 0.9 RATIO
GLUCOSE QUALITATIVE U: NEGATIVE
GLUCOSE SERPL-MCNC: 139 MG/DL
HYALINE CASTS: 0 /LPF
KETONES URINE: NEGATIVE
LEUKOCYTE ESTERASE URINE: ABNORMAL
MICROSCOPIC-UA: NORMAL
NITRITE URINE: POSITIVE
OSMOLALITY SERPL: 317 MOSMOL/KG
OSMOLALITY UR: 527 MOSM/KG
PH URINE: 6
PHOSPHATE SERPL-MCNC: 3.6 MG/DL
POTASSIUM SERPL-SCNC: 5.1 MMOL/L
POTASSIUM UR-SCNC: 35.6 MMOL/L
PROT UR-MCNC: 76 MG/DL
PROTEIN URINE: ABNORMAL
RED BLOOD CELLS URINE: 6 /HPF
SODIUM ?TM SUB UR QN: 63 MMOL/L
SODIUM SERPL-SCNC: 144 MMOL/L
SPECIFIC GRAVITY URINE: 1.02
SQUAMOUS EPITHELIAL CELLS: 5 /HPF
UROBILINOGEN URINE: NORMAL
WHITE BLOOD CELLS URINE: 75 /HPF

## 2020-06-18 LAB — ALDOSTERONE SERUM: 5.6 NG/DL

## 2020-07-20 ENCOUNTER — OUTPATIENT (OUTPATIENT)
Dept: OUTPATIENT SERVICES | Facility: HOSPITAL | Age: 55
LOS: 1 days | End: 2020-07-20
Payer: MEDICAID

## 2020-07-20 ENCOUNTER — APPOINTMENT (OUTPATIENT)
Dept: ULTRASOUND IMAGING | Facility: IMAGING CENTER | Age: 55
End: 2020-07-20
Payer: MEDICAID

## 2020-07-20 DIAGNOSIS — Z87.81 PERSONAL HISTORY OF (HEALED) TRAUMATIC FRACTURE: Chronic | ICD-10-CM

## 2020-07-20 DIAGNOSIS — Z98.891 HISTORY OF UTERINE SCAR FROM PREVIOUS SURGERY: Chronic | ICD-10-CM

## 2020-07-20 DIAGNOSIS — Z98.51 TUBAL LIGATION STATUS: Chronic | ICD-10-CM

## 2020-07-20 DIAGNOSIS — E87.5 HYPERKALEMIA: ICD-10-CM

## 2020-07-20 PROCEDURE — 93975 VASCULAR STUDY: CPT

## 2020-07-20 PROCEDURE — 93975 VASCULAR STUDY: CPT | Mod: 26

## 2020-10-09 ENCOUNTER — APPOINTMENT (OUTPATIENT)
Dept: ENDOCRINOLOGY | Facility: CLINIC | Age: 55
End: 2020-10-09

## 2020-12-14 ENCOUNTER — APPOINTMENT (OUTPATIENT)
Dept: NEPHROLOGY | Facility: CLINIC | Age: 55
End: 2020-12-14

## 2021-03-17 LAB — RENIN ACTIVITY, PLASMA: 0.51 NG/ML/HR

## 2021-04-20 ENCOUNTER — APPOINTMENT (OUTPATIENT)
Dept: ENDOCRINOLOGY | Facility: CLINIC | Age: 56
End: 2021-04-20
Payer: MEDICAID

## 2021-04-20 VITALS
OXYGEN SATURATION: 97 % | DIASTOLIC BLOOD PRESSURE: 94 MMHG | TEMPERATURE: 97.2 F | HEART RATE: 92 BPM | SYSTOLIC BLOOD PRESSURE: 150 MMHG | BODY MASS INDEX: 25.61 KG/M2 | WEIGHT: 140 LBS

## 2021-04-20 LAB — GLUCOSE BLDC GLUCOMTR-MCNC: 72

## 2021-04-20 PROCEDURE — 99072 ADDL SUPL MATRL&STAF TM PHE: CPT

## 2021-04-20 PROCEDURE — 82962 GLUCOSE BLOOD TEST: CPT

## 2021-04-20 PROCEDURE — 95251 CONT GLUC MNTR ANALYSIS I&R: CPT

## 2021-04-20 PROCEDURE — 99214 OFFICE O/P EST MOD 30 MIN: CPT | Mod: 25

## 2021-04-20 NOTE — ASSESSMENT
[Diabetes Foot Care] : diabetes foot care [Long Term Vascular Complications] : long term vascular complications of diabetes [Carbohydrate Consistent Diet] : carbohydrate consistent diet [Importance of Diet and Exercise] : importance of diet and exercise to improve glycemic control, achieve weight loss and improve cardiovascular health [Exercise/Effect on Glucose] : exercise/effect on glucose [Hypoglycemia Management] : hypoglycemia management [Glucagon Use] : glucagon use [Ketone Testing] : ketone testing [Action and use of Insulin] : action and use of short and long-acting insulin [Self Monitoring of Blood Glucose] : self monitoring of blood glucose [Insulin Self-Administration] : insulin self-administration [Injection Technique, Storage, Sharps Disposal] : injection technique, storage, and sharps disposal [Sick-Day Management] : sick-day management [Retinopathy Screening] : Patient was referred to ophthalmology for retinopathy screening [FreeTextEntry1] : Patient is a 55-year-old woman with history of type 1 diabetes, hypertension, hyperlipidemia, here for endocrinology follow-up, patient is to follow-up with me, then transitioned her care to Dr. Jeanette Valentin, now transferring back to me for continued diabetes management. \par \par 1.  Type 1 diabetes mellitus\par A1c 7.3% 5/8/2020\par Basaglar 12 units at bedtime. \par Continue with current ICR of 1:15 with meals\par ISF 1:70 mg/dl above 120mg/dl.  \par On basal bolus regimen\par Ophthalmology: up to date \par Podiatry: up to date \par Has glucagon at home, has ketone strips at home.  \par \par 2.  Hypertension\par History of white coat hypertension.  \par BP today: 150/94\par EGFR 82 mL/min/1.73M2, 6/15/2020, repeat EGFR today. \par Microalbumin/creatinine ratio 197 mg/ML August 13, 2019, will repeat today\par \par 3.  Hyperlipidemia\par  mg/dL 5/8/2020\par Atorvastatin 20 mg once daily.  \par \par 4. Screening\par Will screen for celiac and hypothyroidism given history of Type 1 DM.

## 2021-04-20 NOTE — HISTORY OF PRESENT ILLNESS
[Continuous Glucose Monitoring] : Continuous Glucose Monitoring: Yes [Dexcom] : Dexcom [FreeTextEntry2] : 63 [FreeTextEntry1] : 55 yo woman with type 1 DM, diagnosed at age 15 here for follow up. \par \par She also has hypertension and hyperlipemia.\par Her current regimen is Basalgar 12 units QHS, IC ratio of 1:15 (breakfast/lunch/dinner)\par On average, she takes HL 4-5 before lunch, then about 6-7 units with dinner TID), ISF 1:70, target 120. \par She has the DexComn G6 sensor. \par \par Has a new home gym, but she's going back to the gym.  She hasn't gotten the COVID 19 vaccine.  \par Afternoon: mid 100s\par \par Dilated eye exam: February 2020, hx of + retinopathy had laser; hx of left eye blindness\par \par No neuropathy, followed with Dr. Quinonez last visit 2 months.  \par \par Regarding HLD,  on atorvastatin \par \par Regarding hypertension, BP today 150/94, she states that she was diagnsoed with white coat hypertension, she had 24 hour BP monitor in the past.  Her cardiologist had placed her on BP meds before and she had hypotension at home.   [FreeTextEntry3] : 31 [FreeTextEntry4] : 2 [FreeTextEntry5] : 146 [FreeTextEntry7] : 47

## 2021-04-20 NOTE — ADDENDUM
[FreeTextEntry1] : ==========\par CGM REPORT:\par ==========\par Patient underwent a CGM study from 4/7/2021–4/20/2021.  14 days of uninterrupted data were downloaded\par The reason for the study was type 1 diabetes mellitus\par Pt´s current therapeutic regimen includes: Basal bolus with Basaglar 12 units, ISS level 1: 70 above 120 mg/dL, ICR 1: 15\par The patient´s blood sugars were in target 63% of the time, above target 33% of the time and below target 3% of the time\par Average blood sugar was 146 mg/dL with standard deviation 47 & coefficient of variation of 32%. Estimated A1c N/A %.\par \par Hypoglycemia: The patient had 3low glucose events, with average duration of N/A minutes.  None of these were severe (<50mg/dL or requiring assistance). The hypoglycemic events typically 2 PM to 4 PM\par \par Overnight trends appreciated were slightly elevated\par Daytime trends appreciated were overall inadequate control\par \par Recommended therapeutic changes based on CGM Study: No changes in regimen.  Patient has been having a nighttime snack, and immunoassay blister containing honey at bedtime.  She will stop doing that\par

## 2021-05-27 LAB
ALBUMIN SERPL ELPH-MCNC: 4.3 G/DL
ALP BLD-CCNC: 115 U/L
ALT SERPL-CCNC: 22 U/L
ANION GAP SERPL CALC-SCNC: 14 MMOL/L
AST SERPL-CCNC: 19 U/L
BILIRUB SERPL-MCNC: 0.2 MG/DL
BUN SERPL-MCNC: 50 MG/DL
CALCIUM SERPL-MCNC: 9.7 MG/DL
CHLORIDE SERPL-SCNC: 107 MMOL/L
CHOLEST SERPL-MCNC: 256 MG/DL
CO2 SERPL-SCNC: 24 MMOL/L
CREAT SERPL-MCNC: 0.85 MG/DL
CREAT SPEC-SCNC: 82 MG/DL
ESTIMATED AVERAGE GLUCOSE: 163 MG/DL
GLUCOSE SERPL-MCNC: 86 MG/DL
HBA1C MFR BLD HPLC: 7.3 %
HDLC SERPL-MCNC: 65 MG/DL
LDLC SERPL CALC-MCNC: 170 MG/DL
MICROALBUMIN 24H UR DL<=1MG/L-MCNC: 19 MG/DL
MICROALBUMIN/CREAT 24H UR-RTO: 231 MG/G
NONHDLC SERPL-MCNC: 191 MG/DL
POTASSIUM SERPL-SCNC: 4.2 MMOL/L
PROT SERPL-MCNC: 7.4 G/DL
SODIUM SERPL-SCNC: 144 MMOL/L
T4 FREE SERPL-MCNC: 1.2 NG/DL
TRIGL SERPL-MCNC: 106 MG/DL
TSH SERPL-ACNC: 3.38 UIU/ML
TTG IGA SER IA-ACNC: <1.2 U/ML
TTG IGA SER-ACNC: NEGATIVE
TTG IGG SER IA-ACNC: 2.4 U/ML
TTG IGG SER IA-ACNC: NEGATIVE

## 2021-08-31 ENCOUNTER — APPOINTMENT (OUTPATIENT)
Dept: ENDOCRINOLOGY | Facility: CLINIC | Age: 56
End: 2021-08-31

## 2021-09-03 ENCOUNTER — RX RENEWAL (OUTPATIENT)
Age: 56
End: 2021-09-03

## 2022-01-01 NOTE — ED ADULT NURSE NOTE - NS PRO PASSIVE SMOKE EXP
No No signs of meconium exposure/Normal patterns of skin texture/Normal patterns of skin integrity/Normal patterns of skin pigmentation/Normal patterns of skin color/Normal patterns of skin vascularity/Normal patterns of skin perfusion/No rashes/No eruptions

## 2022-01-17 ENCOUNTER — LABORATORY RESULT (OUTPATIENT)
Age: 57
End: 2022-01-17

## 2022-01-28 LAB
ALBUMIN SERPL ELPH-MCNC: 4.2 G/DL
ALP BLD-CCNC: 114 U/L
ALT SERPL-CCNC: 18 U/L
ANION GAP SERPL CALC-SCNC: 10 MMOL/L
AST SERPL-CCNC: 18 U/L
BILIRUB SERPL-MCNC: 0.2 MG/DL
BUN SERPL-MCNC: 39 MG/DL
CALCIUM SERPL-MCNC: 9.8 MG/DL
CHLORIDE SERPL-SCNC: 107 MMOL/L
CHOLEST SERPL-MCNC: 244 MG/DL
CO2 SERPL-SCNC: 26 MMOL/L
CREAT SERPL-MCNC: 0.87 MG/DL
ESTIMATED AVERAGE GLUCOSE: 163 MG/DL
GLUCOSE SERPL-MCNC: 74 MG/DL
HBA1C MFR BLD HPLC: 7.3 %
HDLC SERPL-MCNC: 61 MG/DL
LDLC SERPL CALC-MCNC: 160 MG/DL
NONHDLC SERPL-MCNC: 183 MG/DL
POTASSIUM SERPL-SCNC: 5.2 MMOL/L
PROT SERPL-MCNC: 7.2 G/DL
SODIUM SERPL-SCNC: 142 MMOL/L
TRIGL SERPL-MCNC: 115 MG/DL
TSH SERPL-ACNC: 6.11 UIU/ML
TTG IGA SER IA-ACNC: <1.2 U/ML
TTG IGA SER-ACNC: NEGATIVE
TTG IGG SER IA-ACNC: 1.2 U/ML
TTG IGG SER IA-ACNC: NEGATIVE

## 2022-02-01 ENCOUNTER — RX CHANGE (OUTPATIENT)
Age: 57
End: 2022-02-01

## 2022-02-11 ENCOUNTER — APPOINTMENT (OUTPATIENT)
Dept: ENDOCRINOLOGY | Facility: CLINIC | Age: 57
End: 2022-02-11
Payer: MEDICAID

## 2022-02-11 ENCOUNTER — RX CHANGE (OUTPATIENT)
Age: 57
End: 2022-02-11

## 2022-02-11 VITALS
SYSTOLIC BLOOD PRESSURE: 180 MMHG | HEART RATE: 99 BPM | WEIGHT: 140.6 LBS | TEMPERATURE: 97.2 F | DIASTOLIC BLOOD PRESSURE: 90 MMHG | BODY MASS INDEX: 25.72 KG/M2 | OXYGEN SATURATION: 98 %

## 2022-02-11 PROCEDURE — 95251 CONT GLUC MNTR ANALYSIS I&R: CPT

## 2022-02-11 PROCEDURE — 99214 OFFICE O/P EST MOD 30 MIN: CPT | Mod: 25

## 2022-02-11 RX ORDER — PITAVASTATIN CALCIUM 1.04 MG/1
1 TABLET, FILM COATED ORAL
Qty: 90 | Refills: 1 | Status: DISCONTINUED | COMMUNITY
Start: 2022-01-28 | End: 2022-02-11

## 2022-02-11 NOTE — ADDENDUM
[FreeTextEntry1] : ==========\par CGM REPORT:\par ==========\par Patient underwent a CGM study from 1/27/2022–2/9/2022.  14 days of uninterrupted data were downloaded\par The reason for the study was type 1 diabetes\par Pt´s current therapeutic regimen includes: Basal bolus insulin as per HPI\par The patient´s blood sugars were in target 55% of the time, above target 47% of the time and below target less than 2% % of the time\par Average blood sugar was 167 mg/dL\par With a standard deviation of 58 mg/dL.\par GMI nonapplicable.\par Sensor usage 86% of the time\par \par She has a pattern of nighttime highs between 12 AM and 3:20 AM.\par \par Overnight trends appreciated were all for nighttime high\par Daytime trends appreciated were mostly in range\par \par Recommended therapeutic changes based on CGM Study: They were too variable to make any significant changes.  For now continue with her current insulin regimen.\par

## 2022-02-11 NOTE — ASSESSMENT
[FreeTextEntry1] : Patient is a 55-year-old woman with history of type 1 diabetes, hypertension, hyperlipidemia, here for endocrinology follow-up, patient is to follow-up with me, then transitioned her care to Dr. Jeanette Valentin, now transferring back to me for continued diabetes management. \par \par 1.  Type 1 diabetes mellitus\par A1C 7.3% 1/18/2022\par Basaglar 13 units at bedtime. \par Continue with current ICR of 1:15 with meals\par ISF 1:70 mg/dl above 120mg/dl. \par  is doing catastrophy , they are both travelling.  \par She does the paperwork.  \par On basal bolus regimen\par last dilated eye exam: right before the end of the year, Nov 2021.  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 \par Podiatirst: 02/11/2022 up to date with her podiatirst.  \par Has glucagon at home, has ketone strips at home.  \par Currently on Dexcom G6.\par \par 2.  Hypertension\par 180/90\par White coat hypertension, at home, BP with SBP in 120-130s. \par History of white coat hypertension.  \par EGFR 82 mL/min/1.73M2, 6/15/2020, repeat EGFR today. \par \par 3.  Hyperlipidemia\par  mg/dL 5/8/2020\par Not able to tolerate atorvastatin.\par Crestor 5 mg once daily.  If unable to tolerate, decrease to 5 mg every other day.\par Then consider starting Zetia.\par \par 4.  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  Therefore they are traveling over the country. [Diabetes Foot Care] : diabetes foot care [Long Term Vascular Complications] : long term vascular complications of diabetes [Carbohydrate Consistent Diet] : carbohydrate consistent diet [Importance of Diet and Exercise] : importance of diet and exercise to improve glycemic control, achieve weight loss and improve cardiovascular health [Exercise/Effect on Glucose] : exercise/effect on glucose [Hypoglycemia Management] : hypoglycemia management [Glucagon Use] : glucagon use [Ketone Testing] : ketone testing [Action and use of Insulin] : action and use of short and long-acting insulin [Self Monitoring of Blood Glucose] : self monitoring of blood glucose [Insulin Self-Administration] : insulin self-administration [Injection Technique, Storage, Sharps Disposal] : injection technique, storage, and sharps disposal [Sick-Day Management] : sick-day management [Retinopathy Screening] : Patient was referred to ophthalmology for retinopathy screening

## 2022-02-11 NOTE — HISTORY OF PRESENT ILLNESS
[FreeTextEntry1] : 55 yo woman with type 1 DM, diagnosed at age 15 here for follow up.  Visit was in April 2021.\par \par Diabetes complicated by left secondary to diabetic retinopathy.  No history of diabetic neuropathy or nephropathy.\par \par She also has hypertension and hyperlipemia.\par \par Her current regimen is Basalgar 13 units QHS, IC ratio of 1:15 (breakfast/lunch/dinner)\par On average, she takes HL 4-5 before lunch, then about 6-7 units with dinner TID), ISF 1:70, target 120. \par She has the DexComn G6 sensor.  Which she really likes.\par \par Has a new home gym, but she's going back to the gym.  She hasn't gotten the COVID 19 vaccine.  \par \par Regarding hyperlipidemia, unable to tolerate atorvastatin.  Was provided with a prescription with pitavastatin  covered by insurance.\par \par Patient reports a history of whitecoat hypertension.  States that she monitors her BP at home, usually systolic in 120 at home.  Blood pressure was significantly elevated in the office today.

## 2022-02-14 ENCOUNTER — RX CHANGE (OUTPATIENT)
Age: 57
End: 2022-02-14

## 2022-02-14 RX ORDER — INSULIN GLARGINE 100 [IU]/ML
100 INJECTION, SOLUTION SUBCUTANEOUS
Qty: 15 | Refills: 1 | Status: DISCONTINUED | COMMUNITY
Start: 2017-07-16 | End: 2022-02-14

## 2022-02-15 ENCOUNTER — RX CHANGE (OUTPATIENT)
Age: 57
End: 2022-02-15

## 2022-02-16 ENCOUNTER — RX CHANGE (OUTPATIENT)
Age: 57
End: 2022-02-16

## 2022-03-11 ENCOUNTER — RX CHANGE (OUTPATIENT)
Age: 57
End: 2022-03-11

## 2022-09-06 ENCOUNTER — APPOINTMENT (OUTPATIENT)
Dept: ENDOCRINOLOGY | Facility: CLINIC | Age: 57
End: 2022-09-06

## 2022-09-06 VITALS
OXYGEN SATURATION: 97 % | BODY MASS INDEX: 23.16 KG/M2 | TEMPERATURE: 96.3 F | HEART RATE: 171 BPM | WEIGHT: 126.6 LBS | SYSTOLIC BLOOD PRESSURE: 156 MMHG | DIASTOLIC BLOOD PRESSURE: 88 MMHG

## 2022-09-06 DIAGNOSIS — E78.5 HYPERLIPIDEMIA, UNSPECIFIED: ICD-10-CM

## 2022-09-06 DIAGNOSIS — I10 ESSENTIAL (PRIMARY) HYPERTENSION: ICD-10-CM

## 2022-09-06 DIAGNOSIS — E03.8 OTHER SPECIFIED HYPOTHYROIDISM: ICD-10-CM

## 2022-09-06 LAB
GLUCOSE BLDC GLUCOMTR-MCNC: 65
HBA1C MFR BLD HPLC: 6.6

## 2022-09-06 PROCEDURE — 99214 OFFICE O/P EST MOD 30 MIN: CPT | Mod: 25

## 2022-09-06 PROCEDURE — 83036 HEMOGLOBIN GLYCOSYLATED A1C: CPT | Mod: QW

## 2022-09-06 PROCEDURE — 82962 GLUCOSE BLOOD TEST: CPT

## 2022-09-06 RX ORDER — ROSUVASTATIN CALCIUM 5 MG/1
5 TABLET, FILM COATED ORAL DAILY
Qty: 90 | Refills: 3 | Status: ACTIVE | COMMUNITY
Start: 2022-02-11 | End: 1900-01-01

## 2022-09-06 RX ORDER — URINE ACETONE TEST STRIPS
STRIP MISCELLANEOUS
Qty: 1 | Refills: 1 | Status: ACTIVE | COMMUNITY
Start: 2017-08-14 | End: 1900-01-01

## 2022-09-06 NOTE — THERAPY
[Today's Date] : [unfilled] [Basaglar] : Basaglar [Admelog] : Admelog [BG Target = ____] : BG Target = [unfilled] [Insulin Sensitivity Factor = ____] : Insulin Sensitivity Factor = [unfilled] [FreeTextEntry9] : 11 units  [de-identified] : 1:15

## 2022-09-06 NOTE — ASSESSMENT
[Diabetes Foot Care] : diabetes foot care [Long Term Vascular Complications] : long term vascular complications of diabetes [Carbohydrate Consistent Diet] : carbohydrate consistent diet [Importance of Diet and Exercise] : importance of diet and exercise to improve glycemic control, achieve weight loss and improve cardiovascular health [Exercise/Effect on Glucose] : exercise/effect on glucose [Hypoglycemia Management] : hypoglycemia management [Glucagon Use] : glucagon use [Ketone Testing] : ketone testing [Action and use of Insulin] : action and use of short and long-acting insulin [Self Monitoring of Blood Glucose] : self monitoring of blood glucose [Insulin Self-Administration] : insulin self-administration [Injection Technique, Storage, Sharps Disposal] : injection technique, storage, and sharps disposal [Sick-Day Management] : sick-day management [Retinopathy Screening] : Patient was referred to ophthalmology for retinopathy screening [FreeTextEntry1] : Patient is a 57-year-old woman with history of type 1 diabetes, hypertension, hyperlipidemia, here for endocrinology follow-up.  \par \par 1.  Type 1 diabetes mellitus\par A1C today 6.6% which is currently on target.  9/6/2022\par Patient had lost weight and is eating less processed food.\par For now recommend with her current regimen which is:\par Basaglar 12 units at bedtime. \par Continue with current ICR of 1:15 with meals\par ISF 1:70 mg/dl above 120mg/dl. \par  is doing catastrophe , they are both travelling.  \par last dilated eye exam: right before the end of the year, Nov 2021; last seen in July 2022, eyes improved.   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 \par Podiatry: Foot exa 09/06/2022 within normal limits.  \par Has glucagon at home, has ketone strips at home.  \par Currently on Dexcom G6 \par \par 2.  Hypertension\par Blood pressure today 156/88\par White coat hypertension, at home, BP with SBP in 120-130s. \par History of white coat hypertension.  \par EGFR 82 mL/min/1.73M2, 6/15/2020, repeat EGFR today.\par Patient has BP machine at home and reports that her blood pressure is within normal limits.\par \par 3.  Hyperlipidemia\par  mg/dL\par Taking statin intermittently, encourage adherence to statin therapy\par \par 4. Screening\par TFT check in January 2022, consistent with subclinical hypothyroidism.\par Clinically euthyroid\par Repeat TFT next visit.

## 2022-09-06 NOTE — HISTORY OF PRESENT ILLNESS
[FreeTextEntry1] : 58 yo woman with type 1 DM, diagnosed at age 15 here for follow up.  Visit was in April 2021.\par \par Diabetes complicated by left secondary to diabetic retinopathy.  No history of diabetic neuropathy or nephropathy.\par \par She also has hypertension and hyperlipemia.\par \par Her current regimen is Basalgar 11 units QHS, IC ratio of 1:15 (breakfast/lunch/dinner)\par On average, she takes HL 4-5 before lunch, then about 6-7 units with dinner TID), ISF 1:70, target 120. \par She has the DexComn G6 sensor.  Which she really likes.\par \par She has 5 children. She is a grandmother.  \par Has a new home gym, but she's going back to the gym.  She hasn't gotten the COVID 19 vaccine.  \par \par Regarding hyperlipidemia, unable to tolerate atorvastatin.  Was provided with a prescription with pitavastatin  covered by insurance.\par \par Patient reports a history of whitecoat hypertension.  States that she monitors her BP at home, usually systolic in 120 at home.  Blood pressure was significantly elevated in the office today.\par \par 24 hour \par Breakfast: greens jane, spinach and berries. (1 units)\par Lunch: She got rid of all wheat, sugar and soy and corn; she's now eating a lot of food made with almond flour, vegetable, fruits, grass fed beef and free-cage chicken. \par Dinner: Similar\par No soda, no juice.  \par  [Continuous Glucose Monitoring] : Continuous Glucose Monitoring: Yes [Dexcom] : Dexcom

## 2023-04-03 ENCOUNTER — APPOINTMENT (OUTPATIENT)
Dept: ENDOCRINOLOGY | Facility: CLINIC | Age: 58
End: 2023-04-03
Payer: MEDICAID

## 2023-04-03 VITALS
HEIGHT: 62 IN | WEIGHT: 126 LBS | HEART RATE: 84 BPM | DIASTOLIC BLOOD PRESSURE: 84 MMHG | BODY MASS INDEX: 23.19 KG/M2 | SYSTOLIC BLOOD PRESSURE: 130 MMHG | OXYGEN SATURATION: 99 %

## 2023-04-03 DIAGNOSIS — E10.9 TYPE 1 DIABETES MELLITUS W/OUT COMPLICATIONS: ICD-10-CM

## 2023-04-03 PROCEDURE — 83036 HEMOGLOBIN GLYCOSYLATED A1C: CPT | Mod: QW

## 2023-04-03 PROCEDURE — 99214 OFFICE O/P EST MOD 30 MIN: CPT

## 2023-04-03 RX ORDER — INSULIN LISPRO 100 U/ML
100 INJECTION, SOLUTION SUBCUTANEOUS
Qty: 10 | Refills: 2 | Status: ACTIVE | COMMUNITY
Start: 2019-01-18 | End: 1900-01-01

## 2023-04-03 RX ORDER — PEN NEEDLE, DIABETIC 29 G X1/2"
32G X 4 MM NEEDLE, DISPOSABLE MISCELLANEOUS
Qty: 5 | Refills: 3 | Status: ACTIVE | COMMUNITY
Start: 2017-12-28 | End: 1900-01-01

## 2023-04-03 RX ORDER — INSULIN GLARGINE 100 [IU]/ML
100 INJECTION, SOLUTION SUBCUTANEOUS
Qty: 30 | Refills: 1 | Status: ACTIVE | COMMUNITY
Start: 2022-02-14 | End: 1900-01-01

## 2023-04-03 NOTE — THERAPY
[Today's Date] : [unfilled] [Basaglar] : Basaglar [_____] :  [unfilled] grams/unit [BG Target = ____] : BG Target = [unfilled] [Insulin Sensitivity Factor = ____] : Insulin Sensitivity Factor = [unfilled] [FreeTextEntry9] : 12 units at bedtime [FreeTextEntry2] : Insulin to carbohydrate ratio:

## 2023-04-03 NOTE — HISTORY OF PRESENT ILLNESS
[Continuous Glucose Monitoring] : Continuous Glucose Monitoring: Yes [Dexcom] : Dexcom [FreeTextEntry1] : 57-year-old woman with type 1 diabetes, diagnosed at the age of 15.  Here for endocrinology follow-up visit.\par \par Diabetes complication, left eye blindness secondary to diabetic retinopathy.  No known history of diabetic neuropathy or nephropathy.\par \par No history of CAD, CHF or CVA.\par \par Currently on Basaglar 12 units at bedtime, IC ratio 1:15 grams of carbohydrate per unit, ISF 1: 70 with a target of 120 mg/dL.\par She uses a Dexcom G6 sensor.  She really likes it.\par \par She has 5 children, she is recently a grandmother.  Her son who is 30 years old, recently passed away from drug overdose.  She is in grieving.  She has been watching her diet as much.\par \par Regarding hypertension, unable to tolerate atorvastatin.  Currently on Crestor 25 mg once daily\par \par Regarding blood pressure, reports that she has whitecoat hypertension.  BP today 130/84.

## 2023-04-03 NOTE — ASSESSMENT
[FreeTextEntry1] : Patient is a 57-year-old woman with history of type 1 diabetes, hypertension, hyperlipidemia, here for endocrinology follow-up.  \par \par 1.  Type 1 diabetes mellitus\par A1c higher today, 7.5%\par She is still in shock after her youngest son passed away from drug overdose 3 weeks ago.\par For now recommend with her current regimen which is:\par Basaglar 12 units at bedtime. \par Continue with current ICR of 1:15 with meals\par ISF 1:70 mg/dl above 120mg/dl. \par  is doing catastrophe , they are both travelling.  \par last dilated eye exam: right before the end of the year, Nov 2021; last seen in July 2022, eyes improved.   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 \par Patient appreciate a podiatry referral.  Foot exam within normal limits in September 2022.\par Has glucagon at home, has ketone strips at home.  \par Currently on Dexcom G6 \par \par 2.  Hypertension\par BP goal <130/80\par Recommend to continue to monitor\par History of white coat hypertension.  \par BP today with acceptable range.\par Check ACR ratio.\par \par 3.  Hyperlipidemia\par  mg/dL\par Repeat lipid profile\par Continue with Crestor 5 mg once daily

## 2023-04-05 LAB
ALBUMIN SERPL ELPH-MCNC: 4.4 G/DL
ALP BLD-CCNC: 89 U/L
ALT SERPL-CCNC: 22 U/L
ANION GAP SERPL CALC-SCNC: 13 MMOL/L
AST SERPL-CCNC: 25 U/L
BILIRUB SERPL-MCNC: 0.3 MG/DL
BUN SERPL-MCNC: 35 MG/DL
CALCIUM SERPL-MCNC: 10.2 MG/DL
CHLORIDE SERPL-SCNC: 107 MMOL/L
CHOLEST SERPL-MCNC: 293 MG/DL
CO2 SERPL-SCNC: 24 MMOL/L
CREAT SERPL-MCNC: 0.73 MG/DL
CREAT SPEC-SCNC: 54 MG/DL
EGFR: 97 ML/MIN/1.73M2
GLUCOSE SERPL-MCNC: 165 MG/DL
HBA1C MFR BLD HPLC: 7.5
HDLC SERPL-MCNC: 80 MG/DL
LDLC SERPL CALC-MCNC: 187 MG/DL
MICROALBUMIN 24H UR DL<=1MG/L-MCNC: 28.9 MG/DL
MICROALBUMIN/CREAT 24H UR-RTO: 538 MG/G
NONHDLC SERPL-MCNC: 213 MG/DL
POTASSIUM SERPL-SCNC: 6.1 MMOL/L
PROT SERPL-MCNC: 7.4 G/DL
SODIUM SERPL-SCNC: 144 MMOL/L
TRIGL SERPL-MCNC: 130 MG/DL

## 2023-07-31 NOTE — ED PROVIDER NOTE - NS ED NOTE AC HIGH RISK COUNTRIES
Radiologist confirmed results of ankle x-ray which remain the same as the [facility]mediate [facility] physician's results.  Patient or parent of patient was notified of these results at time of visit.     No

## 2023-10-13 ENCOUNTER — APPOINTMENT (OUTPATIENT)
Dept: ENDOCRINOLOGY | Facility: CLINIC | Age: 58
End: 2023-10-13

## 2023-11-13 ENCOUNTER — APPOINTMENT (OUTPATIENT)
Dept: ENDOCRINOLOGY | Facility: CLINIC | Age: 58
End: 2023-11-13

## 2024-01-17 ENCOUNTER — NON-APPOINTMENT (OUTPATIENT)
Age: 59
End: 2024-01-17

## 2024-01-19 ENCOUNTER — INPATIENT (INPATIENT)
Facility: HOSPITAL | Age: 59
LOS: 4 days | Discharge: HOME | DRG: 639 | End: 2024-01-24
Attending: HOSPITALIST | Admitting: STUDENT IN AN ORGANIZED HEALTH CARE EDUCATION/TRAINING PROGRAM
Payer: MEDICAID

## 2024-01-19 VITALS
TEMPERATURE: 98 F | OXYGEN SATURATION: 98 % | WEIGHT: 119.93 LBS | HEART RATE: 89 BPM | HEIGHT: 61 IN | SYSTOLIC BLOOD PRESSURE: 153 MMHG | RESPIRATION RATE: 20 BRPM | DIASTOLIC BLOOD PRESSURE: 83 MMHG

## 2024-01-19 DIAGNOSIS — Z87.81 PERSONAL HISTORY OF (HEALED) TRAUMATIC FRACTURE: Chronic | ICD-10-CM

## 2024-01-19 DIAGNOSIS — E11.10 TYPE 2 DIABETES MELLITUS WITH KETOACIDOSIS WITHOUT COMA: ICD-10-CM

## 2024-01-19 DIAGNOSIS — I10 ESSENTIAL (PRIMARY) HYPERTENSION: ICD-10-CM

## 2024-01-19 DIAGNOSIS — E78.5 HYPERLIPIDEMIA, UNSPECIFIED: ICD-10-CM

## 2024-01-19 DIAGNOSIS — Z98.51 TUBAL LIGATION STATUS: Chronic | ICD-10-CM

## 2024-01-19 DIAGNOSIS — Z98.891 HISTORY OF UTERINE SCAR FROM PREVIOUS SURGERY: Chronic | ICD-10-CM

## 2024-01-19 DIAGNOSIS — E10.10 TYPE 1 DIABETES MELLITUS WITH KETOACIDOSIS WITHOUT COMA: ICD-10-CM

## 2024-01-19 DIAGNOSIS — E03.8 OTHER SPECIFIED HYPOTHYROIDISM: ICD-10-CM

## 2024-01-19 LAB
ALBUMIN SERPL ELPH-MCNC: 4 G/DL — SIGNIFICANT CHANGE UP (ref 3.3–5)
ALP SERPL-CCNC: 104 U/L — SIGNIFICANT CHANGE UP (ref 40–120)
ALT FLD-CCNC: 20 U/L — SIGNIFICANT CHANGE UP (ref 10–45)
ANION GAP SERPL CALC-SCNC: 23 MMOL/L — HIGH (ref 5–17)
ANION GAP SERPL CALC-SCNC: 29 MMOL/L — HIGH (ref 5–17)
AST SERPL-CCNC: 22 U/L — SIGNIFICANT CHANGE UP (ref 10–40)
B-OH-BUTYR SERPL-SCNC: >8 MMOL/L — HIGH
BASE EXCESS BLDV CALC-SCNC: -10 MMOL/L — LOW (ref -2–3)
BASOPHILS # BLD AUTO: 0.02 K/UL — SIGNIFICANT CHANGE UP (ref 0–0.2)
BASOPHILS NFR BLD AUTO: 0.2 % — SIGNIFICANT CHANGE UP (ref 0–2)
BILIRUB SERPL-MCNC: 0.2 MG/DL — SIGNIFICANT CHANGE UP (ref 0.2–1.2)
BUN SERPL-MCNC: 44 MG/DL — HIGH (ref 7–23)
BUN SERPL-MCNC: 48 MG/DL — HIGH (ref 7–23)
CA-I SERPL-SCNC: 1.28 MMOL/L — SIGNIFICANT CHANGE UP (ref 1.15–1.33)
CALCIUM SERPL-MCNC: 8.4 MG/DL — SIGNIFICANT CHANGE UP (ref 8.4–10.5)
CALCIUM SERPL-MCNC: 9.4 MG/DL — SIGNIFICANT CHANGE UP (ref 8.4–10.5)
CHLORIDE BLDV-SCNC: 100 MMOL/L — SIGNIFICANT CHANGE UP (ref 96–108)
CHLORIDE SERPL-SCNC: 103 MMOL/L — SIGNIFICANT CHANGE UP (ref 96–108)
CHLORIDE SERPL-SCNC: 94 MMOL/L — LOW (ref 96–108)
CO2 BLDV-SCNC: 18 MMOL/L — LOW (ref 22–26)
CO2 SERPL-SCNC: 15 MMOL/L — LOW (ref 22–31)
CO2 SERPL-SCNC: 15 MMOL/L — LOW (ref 22–31)
CREAT SERPL-MCNC: 0.75 MG/DL — SIGNIFICANT CHANGE UP (ref 0.5–1.3)
CREAT SERPL-MCNC: 0.84 MG/DL — SIGNIFICANT CHANGE UP (ref 0.5–1.3)
EGFR: 82 ML/MIN/1.73M2 — SIGNIFICANT CHANGE UP
EGFR: 93 ML/MIN/1.73M2 — SIGNIFICANT CHANGE UP
EOSINOPHIL # BLD AUTO: 0 K/UL — SIGNIFICANT CHANGE UP (ref 0–0.5)
EOSINOPHIL NFR BLD AUTO: 0 % — SIGNIFICANT CHANGE UP (ref 0–6)
GAS PNL BLDV: 134 MMOL/L — LOW (ref 136–145)
GAS PNL BLDV: SIGNIFICANT CHANGE UP
GAS PNL BLDV: SIGNIFICANT CHANGE UP
GLUCOSE BLDV-MCNC: 449 MG/DL — HIGH (ref 70–99)
GLUCOSE SERPL-MCNC: 330 MG/DL — HIGH (ref 70–99)
GLUCOSE SERPL-MCNC: 468 MG/DL — CRITICAL HIGH (ref 70–99)
HCG SERPL-ACNC: <2 MIU/ML — SIGNIFICANT CHANGE UP
HCO3 BLDV-SCNC: 17 MMOL/L — LOW (ref 22–29)
HCT VFR BLD CALC: 39.2 % — SIGNIFICANT CHANGE UP (ref 34.5–45)
HCT VFR BLDA CALC: 36 % — SIGNIFICANT CHANGE UP (ref 34.5–46.5)
HGB BLD CALC-MCNC: 12 G/DL — SIGNIFICANT CHANGE UP (ref 11.7–16.1)
HGB BLD-MCNC: 12.3 G/DL — SIGNIFICANT CHANGE UP (ref 11.5–15.5)
IMM GRANULOCYTES NFR BLD AUTO: 0.4 % — SIGNIFICANT CHANGE UP (ref 0–0.9)
LACTATE BLDV-MCNC: 2.5 MMOL/L — HIGH (ref 0.5–2)
LIDOCAIN IGE QN: 17 U/L — SIGNIFICANT CHANGE UP (ref 7–60)
LYMPHOCYTES # BLD AUTO: 0.89 K/UL — LOW (ref 1–3.3)
LYMPHOCYTES # BLD AUTO: 10.5 % — LOW (ref 13–44)
MAGNESIUM SERPL-MCNC: 2.4 MG/DL — SIGNIFICANT CHANGE UP (ref 1.6–2.6)
MCHC RBC-ENTMCNC: 29.1 PG — SIGNIFICANT CHANGE UP (ref 27–34)
MCHC RBC-ENTMCNC: 31.4 GM/DL — LOW (ref 32–36)
MCV RBC AUTO: 92.9 FL — SIGNIFICANT CHANGE UP (ref 80–100)
MONOCYTES # BLD AUTO: 0.58 K/UL — SIGNIFICANT CHANGE UP (ref 0–0.9)
MONOCYTES NFR BLD AUTO: 6.8 % — SIGNIFICANT CHANGE UP (ref 2–14)
NEUTROPHILS # BLD AUTO: 6.97 K/UL — SIGNIFICANT CHANGE UP (ref 1.8–7.4)
NEUTROPHILS NFR BLD AUTO: 82.1 % — HIGH (ref 43–77)
NRBC # BLD: 0 /100 WBCS — SIGNIFICANT CHANGE UP (ref 0–0)
PCO2 BLDV: 39 MMHG — SIGNIFICANT CHANGE UP (ref 39–42)
PH BLDV: 7.24 — LOW (ref 7.32–7.43)
PHOSPHATE SERPL-MCNC: 3.9 MG/DL — SIGNIFICANT CHANGE UP (ref 2.5–4.5)
PLATELET # BLD AUTO: 215 K/UL — SIGNIFICANT CHANGE UP (ref 150–400)
PO2 BLDV: 22 MMHG — LOW (ref 25–45)
POTASSIUM BLDV-SCNC: 5.8 MMOL/L — HIGH (ref 3.5–5.1)
POTASSIUM SERPL-MCNC: 4.7 MMOL/L — SIGNIFICANT CHANGE UP (ref 3.5–5.3)
POTASSIUM SERPL-MCNC: 5.1 MMOL/L — SIGNIFICANT CHANGE UP (ref 3.5–5.3)
POTASSIUM SERPL-SCNC: 4.7 MMOL/L — SIGNIFICANT CHANGE UP (ref 3.5–5.3)
POTASSIUM SERPL-SCNC: 5.1 MMOL/L — SIGNIFICANT CHANGE UP (ref 3.5–5.3)
PROT SERPL-MCNC: 7.5 G/DL — SIGNIFICANT CHANGE UP (ref 6–8.3)
RBC # BLD: 4.22 M/UL — SIGNIFICANT CHANGE UP (ref 3.8–5.2)
RBC # FLD: 12.8 % — SIGNIFICANT CHANGE UP (ref 10.3–14.5)
SAO2 % BLDV: 38.2 % — LOW (ref 67–88)
SODIUM SERPL-SCNC: 138 MMOL/L — SIGNIFICANT CHANGE UP (ref 135–145)
SODIUM SERPL-SCNC: 141 MMOL/L — SIGNIFICANT CHANGE UP (ref 135–145)
T4 FREE SERPL-MCNC: 0.9 NG/DL — SIGNIFICANT CHANGE UP (ref 0.9–1.8)
TROPONIN T, HIGH SENSITIVITY RESULT: 44 NG/L — SIGNIFICANT CHANGE UP (ref 0–51)
TROPONIN T, HIGH SENSITIVITY RESULT: 45 NG/L — SIGNIFICANT CHANGE UP (ref 0–51)
TSH SERPL-MCNC: 1.1 UIU/ML — SIGNIFICANT CHANGE UP (ref 0.27–4.2)
WBC # BLD: 8.49 K/UL — SIGNIFICANT CHANGE UP (ref 3.8–10.5)
WBC # FLD AUTO: 8.49 K/UL — SIGNIFICANT CHANGE UP (ref 3.8–10.5)

## 2024-01-19 PROCEDURE — 99285 EMERGENCY DEPT VISIT HI MDM: CPT

## 2024-01-19 PROCEDURE — 71045 X-RAY EXAM CHEST 1 VIEW: CPT | Mod: 26

## 2024-01-19 RX ORDER — SODIUM CHLORIDE 9 MG/ML
1000 INJECTION INTRAMUSCULAR; INTRAVENOUS; SUBCUTANEOUS ONCE
Refills: 0 | Status: COMPLETED | OUTPATIENT
Start: 2024-01-19 | End: 2024-01-19

## 2024-01-19 RX ORDER — GLUCAGON INJECTION, SOLUTION 0.5 MG/.1ML
1 INJECTION, SOLUTION SUBCUTANEOUS ONCE
Refills: 0 | Status: DISCONTINUED | OUTPATIENT
Start: 2024-01-19 | End: 2024-01-24

## 2024-01-19 RX ORDER — DEXTROSE 50 % IN WATER 50 %
25 SYRINGE (ML) INTRAVENOUS ONCE
Refills: 0 | Status: DISCONTINUED | OUTPATIENT
Start: 2024-01-19 | End: 2024-01-24

## 2024-01-19 RX ORDER — SODIUM CHLORIDE 9 MG/ML
1000 INJECTION INTRAMUSCULAR; INTRAVENOUS; SUBCUTANEOUS ONCE
Refills: 0 | Status: DISCONTINUED | OUTPATIENT
Start: 2024-01-19 | End: 2024-01-19

## 2024-01-19 RX ORDER — FAMOTIDINE 10 MG/ML
20 INJECTION INTRAVENOUS ONCE
Refills: 0 | Status: COMPLETED | OUTPATIENT
Start: 2024-01-19 | End: 2024-01-19

## 2024-01-19 RX ORDER — ONDANSETRON 8 MG/1
4 TABLET, FILM COATED ORAL ONCE
Refills: 0 | Status: COMPLETED | OUTPATIENT
Start: 2024-01-19 | End: 2024-01-19

## 2024-01-19 RX ORDER — SODIUM CHLORIDE 9 MG/ML
1000 INJECTION, SOLUTION INTRAVENOUS
Refills: 0 | Status: DISCONTINUED | OUTPATIENT
Start: 2024-01-19 | End: 2024-01-24

## 2024-01-19 RX ORDER — DEXTROSE 50 % IN WATER 50 %
15 SYRINGE (ML) INTRAVENOUS ONCE
Refills: 0 | Status: DISCONTINUED | OUTPATIENT
Start: 2024-01-19 | End: 2024-01-24

## 2024-01-19 RX ORDER — INSULIN LISPRO 100/ML
10 VIAL (ML) SUBCUTANEOUS ONCE
Refills: 0 | Status: COMPLETED | OUTPATIENT
Start: 2024-01-19 | End: 2024-01-19

## 2024-01-19 RX ORDER — ACETAMINOPHEN 500 MG
1000 TABLET ORAL ONCE
Refills: 0 | Status: COMPLETED | OUTPATIENT
Start: 2024-01-19 | End: 2024-01-19

## 2024-01-19 RX ORDER — INSULIN GLARGINE 100 [IU]/ML
10 INJECTION, SOLUTION SUBCUTANEOUS AT BEDTIME
Refills: 0 | Status: DISCONTINUED | OUTPATIENT
Start: 2024-01-20 | End: 2024-01-20

## 2024-01-19 RX ORDER — INSULIN GLARGINE 100 [IU]/ML
10 INJECTION, SOLUTION SUBCUTANEOUS ONCE
Refills: 0 | Status: COMPLETED | OUTPATIENT
Start: 2024-01-19 | End: 2024-01-19

## 2024-01-19 RX ORDER — INSULIN LISPRO 100/ML
VIAL (ML) SUBCUTANEOUS EVERY 4 HOURS
Refills: 0 | Status: DISCONTINUED | OUTPATIENT
Start: 2024-01-19 | End: 2024-01-20

## 2024-01-19 RX ORDER — DEXTROSE 50 % IN WATER 50 %
12.5 SYRINGE (ML) INTRAVENOUS ONCE
Refills: 0 | Status: DISCONTINUED | OUTPATIENT
Start: 2024-01-19 | End: 2024-01-24

## 2024-01-19 RX ORDER — SODIUM CHLORIDE 9 MG/ML
1000 INJECTION, SOLUTION INTRAVENOUS
Refills: 0 | Status: DISCONTINUED | OUTPATIENT
Start: 2024-01-19 | End: 2024-01-19

## 2024-01-19 RX ORDER — SODIUM CHLORIDE 9 MG/ML
1000 INJECTION, SOLUTION INTRAVENOUS
Refills: 0 | Status: DISCONTINUED | OUTPATIENT
Start: 2024-01-19 | End: 2024-01-20

## 2024-01-19 RX ADMIN — SODIUM CHLORIDE 150 MILLILITER(S): 9 INJECTION, SOLUTION INTRAVENOUS at 21:37

## 2024-01-19 RX ADMIN — ONDANSETRON 4 MILLIGRAM(S): 8 TABLET, FILM COATED ORAL at 13:44

## 2024-01-19 RX ADMIN — SODIUM CHLORIDE 1000 MILLILITER(S): 9 INJECTION INTRAMUSCULAR; INTRAVENOUS; SUBCUTANEOUS at 15:20

## 2024-01-19 RX ADMIN — Medication 400 MILLIGRAM(S): at 13:43

## 2024-01-19 RX ADMIN — SODIUM CHLORIDE 1000 MILLILITER(S): 9 INJECTION INTRAMUSCULAR; INTRAVENOUS; SUBCUTANEOUS at 13:42

## 2024-01-19 RX ADMIN — Medication 10 UNIT(S): at 14:44

## 2024-01-19 RX ADMIN — Medication 3: at 20:09

## 2024-01-19 RX ADMIN — Medication 2: at 23:14

## 2024-01-19 RX ADMIN — Medication 30 MILLILITER(S): at 15:20

## 2024-01-19 RX ADMIN — INSULIN GLARGINE 10 UNIT(S): 100 INJECTION, SOLUTION SUBCUTANEOUS at 18:43

## 2024-01-19 RX ADMIN — FAMOTIDINE 20 MILLIGRAM(S): 10 INJECTION INTRAVENOUS at 13:43

## 2024-01-19 RX ADMIN — Medication 30 MILLILITER(S): at 23:02

## 2024-01-19 RX ADMIN — SODIUM CHLORIDE 150 MILLILITER(S): 9 INJECTION, SOLUTION INTRAVENOUS at 23:02

## 2024-01-19 NOTE — ED PROVIDER NOTE - PROGRESS NOTE DETAILS
Veronica Gee, PGY-2: gave insulin 10u and 2L NS for DKA. paged endocrine fellow x2 Endo at bedside. Further recs pending. Veronica Gee, PGY-2: Discussed case with Dr. Hakeem Martinez. [facility] Gee, PGY-2: glucose 250. switched fluids to d5NS, will repeat VBG, BMP, and BHB.

## 2024-01-19 NOTE — ED PROVIDER NOTE - OBJECTIVE STATEMENT
58y F with PMH DM1 on Basaglar 11u, and admelog premeal, HTN, HLD presenting with inability to tolerate PO for 5d. Pt reports that she began to feel body aches 5d ago, with poor appetite, nausea. Yesterday developed a wet cough. Has also been having LUQ burning sensation radiating into chest wince yesterday. Yesterday attempted to take PO but vomited x3. No hematemesis, no diarrhea, or constipation. Has been having BM. No dysuria, or shortness of breath. Of note, pt  has not been taking basal or fast acting insulin for the last 3d. Has sick contacts that have tested positive for flu.

## 2024-01-19 NOTE — ED PROVIDER NOTE - NSICDXPASTMEDICALHX_GEN_ALL_CORE_FT
PAST MEDICAL HISTORY:  Anxiety     Blindness of left eye     Diabetic retinopathy     DM (diabetes mellitus) type 1- since 1980    Essential hypertension     Hydronephrosis     Hyperlipidemia     UTI (urinary tract infection) 09/2017

## 2024-01-19 NOTE — CONSULT NOTE ADULT - SUBJECTIVE AND OBJECTIVE BOX
Consulted for DKA    HPI:  This is a 57 yo F /w a PMH of subclinical hypothyroidism type 1 diabetes, HTN, and HLD who presents with DKA, prompting endocrinology consult    Diagnosed with DM1 at age 15  Follows with Dr. Blair at Endocrine Practice at [address] [place] [address], [address], [place], [place]   Last visit 2023. A1c at that time 7.5% on basaglar 12 and ICR 1:15 and ISF 1:70  Previously using CGM and Dexcomg G6  Diabetes related complications include retinopathy, nephropathy, and neuropathy    Initial labs showing:  glucose 468  CO15  AG 29  HBH >8  pH 7.24    Patient given 10 units of admelog.          PAST MEDICAL & SURGICAL HISTORY:  DM (diabetes mellitus)  type 1- since       Diabetic retinopathy      Blindness of left eye      Anxiety      Hydronephrosis      Essential hypertension      Hyperlipidemia      UTI (urinary tract infection)  2017      H/O:   x 5      H/O tubal ligation        History of shoulder fracture  2017- s/p right shoulder repair          FAMILY HISTORY:  Family history of diabetes mellitus (Father)        Social History:    Home Medications:  Basaglar KwikPen 100 units/mL subcutaneous solution:  subcutaneous 14 unitsa once a day at bedtime (29 Aug 2018 16:43)  HumaLOG 100 units/mL subcutaneous solution:  subcutaneous 8-10 units before meals (29 Aug 2018 16:43)  lisinopril 10 mg oral tablet: 1 tab(s) orally once a day (29 Aug 2018 16:43)  metoprolol succinate 25 mg oral tablet, extended release: 1 tab(s) orally once a day (29 Aug 2018 16:43)  Multiple Vitamins oral capsule: 1 cap(s) orally once a day (29 Aug 2018 16:43)      MEDICATIONS  (STANDING):  sodium chloride 0.9% Bolus 1000 milliLiter(s) IV Bolus once  sodium chloride 0.9% Bolus 1000 milliLiter(s) IV Bolus once    MEDICATIONS  (PRN):      Allergies    No Known Allergies    Intolerances      Review of Systems:  Constitutional: No fever  Eyes: No blurry vision  Neuro: No tremors  HEENT: No pain  Cardiovascular: No chest pain, palpitations  Respiratory: No SOB, no cough  GI: No nausea, vomiting, abdominal pain  : No dysuria  Skin: no rash  Psych: no depression  Endocrine: no polyuria, polydipsia  Hem/lymph: no swelling  Osteoporosis: no fractures    PHYSICAL EXAM:  VITALS: T(C): 36.7 (24 @ 12:12)  T(F): 98.1 (24 @ 12:12), Max: 98.1 (24 @ 12:12)  HR: 89 (24 @ 12:12) (89 - 89)  BP: 153/83 (24 @ 12:12) (153/83 - 153/83)  RR:  (20 - 20)  SpO2:  (98% - 98%)  Wt(kg): --  GENERAL: NAD  EYES: No proptosis, no lid lag, anicteric  THYROID: Normal size, no palpable nodules  RESPIRATORY: Clear to auscultation bilaterally  CARDIOVASCULAR: Regular rate and rhythm  GI: Soft, nontender, non distended  EXT: b/l feet without wounds; 2+ pulses  PSYCH: Alert and oriented x 3, reactive mood    POCT Blood Glucose.: 421 mg/dL (24 @ 14:35)  POCT Blood Glucose.: 432 mg/dL (24 @ 12:55)  POCT Blood Glucose.: 429 mg/dL (24 @ 12:17)  POCT Blood Glucose.: 419 mg/dL (24 @ 12:16)                            12.3   8.49  )-----------( 215      ( 2024 13:40 )             39.2           138  |  94<L>  |  48<H>  ----------------------------<  468<HH>  5.1   |  15<L>  |  0.84    eGFR: 82    Ca    9.4        Mg     2.4       Phos  3.9         TPro  7.5  /  Alb  4.0  /  TBili  0.2  /  DBili  x   /  AST  22  /  ALT  20  /  AlkPhos  104        Thyroid Function Tests:              Radiology:              Consulted for DKA    HPI:  This is a 57 yo F /w a PMH of subclinical hypothyroidism, type 1 diabetes, HTN, and HLD who presents with DKA, prompting endocrinology consult    Patient reports she stopped taking her insulin 4 days ago since she was feeling sick and unable to hold down food. She has been around family members who have the flu.  Stopped seeing Dr. Blair after she lost her insurance    Diagnosed with DM1 at age 15  Follows with Dr. Blair at End[facility] Practice at [address] [place] [address], [address], [place], [place]   Last visit 2023. A1c at that time 7.5% on basaglar 12 and admelog ICR 1:15 and ISF 1:70. Still on this regimen  Sometimes gets overnight hypoglycemia though notes that lately her sugars have been  running 180s-200s for the last year due to stress  Previously using CGM and Dexcomg G6  Diabetes related complications include retinopathy, nephropathy, and neuropathy    Initial labs showing:  glucose 468  CO15  AG 29  BHB >8  pH 7.24    Patient given 10 units of admelog and 1L of IVF. Currently on second liter          PAST MEDICAL & SURGICAL HISTORY:  DM (diabetes mellitus)  type 1- since       Diabetic retinopathy      Blindness of left eye      Anxiety      Hydronephrosis      Essential hypertension      Hyperlipidemia      UTI (urinary tract infection)  2017      H/O:   x 5      H/O tubal ligation        History of shoulder fracture  2017- s/p right shoulder repair          FAMILY HISTORY:  Family history of diabetes mellitus (Father)        Social History: denies all toxic habits    Home Medications:  Basaglar KwikPen 100 units/mL subcutaneous solution:  subcutaneous 14 units a once a day at bedtime (29 Aug 2018 16:43)  HumaLOG 100 units/mL subcutaneous solution:  subcutaneous 8-10 units before meals (29 Aug 2018 16:43)  lisinopril 10 mg oral tablet: 1 tab(s) orally once a day (29 Aug 2018 16:43)  metoprolol succinate 25 mg oral tablet, extended release: 1 tab(s) orally once a day (29 Aug 2018 16:43)  Multiple Vitamins oral capsule: 1 cap(s) orally once a day (29 Aug 2018 16:43)      MEDICATIONS  (STANDING):  sodium chloride 0.9% Bolus 1000 milliLiter(s) IV Bolus once  sodium chloride 0.9% Bolus 1000 milliLiter(s) IV Bolus once    MEDICATIONS  (PRN):      Allergies    No Known Allergies    Intolerances      Review of Systems:  Constitutional: No fever  Eyes: No blurry vision  Neuro: No tremors  HEENT: No pain  Cardiovascular: No chest pain, palpitations  Respiratory: No SOB, no cough  GI: No nausea, vomiting, abdominal pain  : No dysuria  Skin: no rash  Psych: no depression  Endocrine: no polyuria, polydipsia  Hem/lymph: no swelling  Osteoporosis: no fractures    PHYSICAL EXAM:  VITALS: T(C): 36.7 (24 @ 12:12)  T(F): 98.1 (24 @ 12:12), Max: 98.1 (24 @ 12:12)  HR: 89 (24 @ 12:12) (89 - 89)  BP: 153/83 (24 @ 12:12) (153/83 - 153/83)  RR:  (20 - 20)  SpO2:  (98% - 98%)  Wt(kg): --  GENERAL: NAD  EYES: No proptosis, no lid lag, anicteric  THYROID: Normal size, no palpable nodules  RESPIRATORY: Clear to auscultation bilaterally  CARDIOVASCULAR: Regular rate and rhythm  GI: Soft, nontender, non distended  EXT: b/l feet without wounds; 2+ pulses  PSYCH: Alert and oriented x 3, reactive mood    POCT Blood Glucose.: 421 mg/dL (24 @ 14:35)  POCT Blood Glucose.: 432 mg/dL (24 @ 12:55)  POCT Blood Glucose.: 429 mg/dL (24 @ 12:17)  POCT Blood Glucose.: 419 mg/dL (24 @ 12:16)                            12.3   8.49  )-----------( 215      ( 2024 13:40 )             39.2           138  |  94<L>  |  48<H>  ----------------------------<  468<HH>  5.1   |  15<L>  |  0.84    eGFR: 82    Ca    9.4        Mg     2.4       Phos  3.9         TPro  7.5  /  Alb  4.0  /  TBili  0.2  /  DBili  x   /  AST  22  /  ALT  20  /  AlkPhos  104        Thyroid Function Tests:              Radiology:

## 2024-01-19 NOTE — CONSULT NOTE ADULT - ASSESSMENT
This is a 59 yo F /w a PMH of subclinical hypothyroidism type 1 diabetes, HTN, and HLD who presents with DKA, prompting endocrinology consult    #DM1  #DKA  -please obtain HbA1c  -please give lantus stat  -please start low admelog correction scales every 4 hours patient is very insulin sensitive  -please ensure patient receives at least 2 liters of isotonic fluid   -after 2 L of fluid, patient can be started on NS or LR at 150cc/hr. Can add D5 if DKA unresolved and glucose <250 as per protocol  -please check DKA labs every 4-6 hours until DKA is resolved - BMP. VBG, BHB  -DKA is resolved with AG <14, CO2 >18    #post-DKA DM1 plan  -c/w lantus q24hr  -start admelog 4 units before meals  -low admelog correction scales before meals and bedtime  -carb consistent diet  -FSG before meals and bedtime with target glucose 100-180mg/dL  -low admelog correction scales before meals and bedtime  -hypoglycemia protocol in place    #discharge  -resume basal-bolus insulin, doses to be determined based on inpatient requirements  -follow up at Endocrine Practice at [address] [place] [address], [address], [place] [address]; Ph # [phone] with Dr. Blair    #HLD  -resume crestor 5mg daily on discharge    #HTN  -BP target <130/80  -has history of elevated albumin/creatinine ratio in April 2023  -would recommend starting ACEi/ARB therapy on discharge    #subclinical hypothyroidism   -last TSH 6.1,   -patients with DM1 at high risk for hypothyroidism due to Hashimotos  -please check TSH, Free T4, and TPO antibodies    Case discussed with Dr. Raul Edmondson MD  Endocrine Fellow  Can be reached via teams. For follow up questions, discharge recommendations, or new consults, please call answering service at [phone] (weekdays); [phone] (nights/weekends) This is a 59 yo F /w a PMH of subclinical hypothyroidism type 1 diabetes, HTN, and HLD who presents with DKA, prompting endocrinology consult    #DM1  #DKA  -please obtain HbA1c  -please give lantus stat  -please start low admelog correction scales every 4 hours patient is very insulin sensitive  -please ensure patient receives at least 2 liters of isotonic fluid   -after 2 L of fluid, patient can be started on NS or LR at 150cc/hr. Can add D5 if DKA unresolved and glucose <250 as per protocol  -please check DKA labs every 4-6 hours until DKA is resolved - BMP. VBG, BHB  -DKA is resolved with AG <14, CO2 >18    #post-DKA DM1 plan  -c/w lantus q24hr  -start admelog 2 units with breakfast and 4 units with lunch and dinner  -low admelog correction scales before meals and bedtime  -carb consistent diet  -FSG before meals and bedtime with target glucose 100-180mg/dL  -low admelog correction scales before meals and bedtime  -hypoglycemia protocol in place    #discharge  -resume basal-bolus insulin, doses to be determined based on inpatient requirements  -follow up at Endocrine Practice at [address] [place] [address], [address], [place] [address]; Ph # [phone] with Dr. Blair    #HLD  -resume crestor 5mg daily on discharge    #HTN  -BP target <130/80  -has history of elevated albumin/creatinine ratio in April 2023  -would recommend starting ACEi/ARB therapy on discharge    #subclinical hypothyroidism   -last TSH 6.1,   -patients with DM1 at high risk for hypothyroidism due to Hashimoto's  -please check TSH, Free T4, and TPO antibodies    Case discussed with Dr. Raul Edmondson MD  Endocrine Fellow  Can be reached via teams. For follow up questions, discharge recommendations, or new consults, please call answering service at [phone] (weekdays); [phone] (nights/weekends) This is a 59 yo F /w a PMH of subclinical hypothyroidism type 1 diabetes, HTN, and HLD who presents with DKA, prompting endocrinology consult (high risk patient with severely uncontrolled diabetes with DKA and glucose values > 400's at high risk of CAD and CVA with high complexity and high level decision-making).     #DM1  #DKA  -please obtain HbA1c  -please give lantus stat  -please start low admelog correction scales every 4 hours patient is very insulin sensitive  -please ensure patient receives at least 2 liters of isotonic fluid   -after 2 L of fluid, patient can be started on NS or LR at 150cc/hr. Can add D5 if DKA unresolved and glucose <250 as per protocol  -please check DKA labs every 4-6 hours until DKA is resolved - BMP. VBG, BHB  -DKA is resolved with AG <14, CO2 >18    #post-DKA DM1 plan  -c/w lantus q24hr  -start admelog 2 units with breakfast and 4 units with lunch and dinner  -low admelog correction scales before meals and bedtime  -carb consistent diet  -FSG before meals and bedtime with target glucose 100-180mg/dL  -low admelog correction scales before meals and bedtime  -hypoglycemia protocol in place    #discharge  -resume basal-bolus insulin, doses to be determined based on inpatient requirements  -follow up at Endocrine Practice at [address] [place] [address], [address], [place] [address]; Ph # [phone] with Dr. Blair    #HLD  -resume crestor 5mg daily on discharge    #HTN  -BP target <130/80  -has history of elevated albumin/creatinine ratio in April 2023  -would recommend starting ACEi/ARB therapy on discharge    #subclinical hypothyroidism   -last TSH 6.1,   -patients with DM1 at high risk for hypothyroidism due to Hashimoto's  -please check TSH, Free T4, and TPO antibodies    Case discussed with Dr. Raul Edmondson MD  Endocrine Fellow  Can be reached via teams. For follow up questions, discharge recommendations, or new consults, please call answering service at [phone] (weekdays); [phone] (nights/weekends)

## 2024-01-19 NOTE — ED PROVIDER NOTE - CLINICAL SUMMARY MEDICAL DECISION MAKING FREE TEXT BOX
58y F with PMH DM1 on Basaglar 11u, and admelog premeal, HTN, HLD presenting with inability to tolerate PO for 5d.     VS in ED show HTN otherwise WNL. PE shows wet cough; b/l coarse crackles. No abd ttp. no chest wall ttp. Appears thin. Normal cardiac sounds, without murmurs. No LE edema or ttp.     Plan: cbc, cmp, vbg, mag, phos, cxr, bhb, trop, ekg, cardiac monitor, pulse ox.   Pt refusing flu/covid swab because she 'does not believe in covid' 58y F with PMH DM1 on Basaglar 11u, and admelog 8-10 U premeal, HTN, HLD presenting with inability to tolerate PO for 5d in the setting of body aches, mid back pain, nausea, dec appetite, w multiple family members flu pos. Pt reports non-compliance w insulin for past 3-4 days in settig of poor po. Has vomited several times today and c/o epigastri abd and substernal cp burning quality. Hyperglycemic now in ED. Declines rectal temp and viral swab.     VS in ED show HTN otherwise WNL. PE frail thin female, opacified L eye, wet cough; b/l coarse crackles. No abd ttp. Normal cardiac sounds, without murmurs. No LE edema or ttp.     Sx likely d/t viral infection such as flu covid. Check electrolytes for dehydration, norma, DKA, given poor po and med non compliance.Check UA, lipase. Pt defers viral swab. Check trop, ekg given c/o cp and longstanding dm to r/o ACS. Likely TBA.

## 2024-01-19 NOTE — ED ADULT NURSE NOTE - OBJECTIVE STATEMENT
59 y/o female coming to the ER with c/o vomiting. A&Ox4. Ambulatory. PMH DM1. Patient states tuesday she began to "not feel well". Thursday she developed a cough and generalized fatigue. She went to urgent care where she was told it is "likely viral". Patient states today she began to have vomiting x2 leading her to come to the ER. Patient has one episode of vomiting in ER room. Patient endorses a "burning sensation" in her stomach as well as a continued cough. Patient states her daughter and granddaughter are flu positive.

## 2024-01-19 NOTE — ED PROVIDER NOTE - PHYSICAL EXAMINATION
GENERAL: NAD. Thin body habitus  HEAD:  Atraumatic, Normocephalic  EYES: EOMI, conjunctiva and sclera clear  ENT: Moist mucous membranes  NECK: Supple  CHEST/LUNG: Unlabored respirations. wet cough; b/l coarse crackles.  HEART: Regular rate and rhythm. No murmurs, rubs, or gallops  ABDOMEN: Soft, nondistended, nontender  EXTREMITIES:  No cyanosis or edema. Brisk capillary refill  NERVOUS SYSTEM:  No focal deficits. A&Ox3  SKIN: No rashes or lesions

## 2024-01-19 NOTE — ED PROVIDER NOTE - CONDUCTED BY FOR CONSULTANT
Please notify patient of normal biopsy results.  Advise repeat pap with HPV testing in one year.   Negrorin

## 2024-01-19 NOTE — ED PROVIDER NOTE - NSICDXPASTSURGICALHX_GEN_ALL_CORE_FT
PAST SURGICAL HISTORY:  H/O tubal ligation     H/O:  x 5    History of shoulder fracture 2017- s/p right shoulder repair

## 2024-01-20 ENCOUNTER — TRANSCRIPTION ENCOUNTER (OUTPATIENT)
Age: 59
End: 2024-01-20

## 2024-01-20 DIAGNOSIS — E11.10 TYPE 2 DIABETES MELLITUS WITH KETOACIDOSIS WITHOUT COMA: ICD-10-CM

## 2024-01-20 DIAGNOSIS — Z29.9 ENCOUNTER FOR PROPHYLACTIC MEASURES, UNSPECIFIED: ICD-10-CM

## 2024-01-20 LAB
A1C WITH ESTIMATED AVERAGE GLUCOSE RESULT: 12.7 % — HIGH (ref 4–5.6)
ALBUMIN SERPL ELPH-MCNC: 3.5 G/DL — SIGNIFICANT CHANGE UP (ref 3.3–5)
ALP SERPL-CCNC: 84 U/L — SIGNIFICANT CHANGE UP (ref 40–120)
ALT FLD-CCNC: 14 U/L — SIGNIFICANT CHANGE UP (ref 10–45)
ANION GAP SERPL CALC-SCNC: 12 MMOL/L — SIGNIFICANT CHANGE UP (ref 5–17)
ANION GAP SERPL CALC-SCNC: 15 MMOL/L — SIGNIFICANT CHANGE UP (ref 5–17)
ANION GAP SERPL CALC-SCNC: 15 MMOL/L — SIGNIFICANT CHANGE UP (ref 5–17)
ANION GAP SERPL CALC-SCNC: 19 MMOL/L — HIGH (ref 5–17)
AST SERPL-CCNC: 16 U/L — SIGNIFICANT CHANGE UP (ref 10–40)
B-OH-BUTYR SERPL-SCNC: 2.4 MMOL/L — HIGH
B-OH-BUTYR SERPL-SCNC: 5.6 MMOL/L — HIGH
BASE EXCESS BLDV CALC-SCNC: -3.8 MMOL/L — LOW (ref -2–3)
BASE EXCESS BLDV CALC-SCNC: -8 MMOL/L — LOW (ref -2–3)
BILIRUB SERPL-MCNC: 0.2 MG/DL — SIGNIFICANT CHANGE UP (ref 0.2–1.2)
BUN SERPL-MCNC: 18 MG/DL — SIGNIFICANT CHANGE UP (ref 7–23)
BUN SERPL-MCNC: 24 MG/DL — HIGH (ref 7–23)
BUN SERPL-MCNC: 32 MG/DL — HIGH (ref 7–23)
BUN SERPL-MCNC: 40 MG/DL — HIGH (ref 7–23)
CA-I SERPL-SCNC: 1.18 MMOL/L — SIGNIFICANT CHANGE UP (ref 1.15–1.33)
CA-I SERPL-SCNC: 1.19 MMOL/L — SIGNIFICANT CHANGE UP (ref 1.15–1.33)
CALCIUM SERPL-MCNC: 8 MG/DL — LOW (ref 8.4–10.5)
CALCIUM SERPL-MCNC: 8 MG/DL — LOW (ref 8.4–10.5)
CALCIUM SERPL-MCNC: 8.5 MG/DL — SIGNIFICANT CHANGE UP (ref 8.4–10.5)
CALCIUM SERPL-MCNC: 8.8 MG/DL — SIGNIFICANT CHANGE UP (ref 8.4–10.5)
CHLORIDE BLDV-SCNC: 106 MMOL/L — SIGNIFICANT CHANGE UP (ref 96–108)
CHLORIDE BLDV-SCNC: 108 MMOL/L — SIGNIFICANT CHANGE UP (ref 96–108)
CHLORIDE SERPL-SCNC: 105 MMOL/L — SIGNIFICANT CHANGE UP (ref 96–108)
CHLORIDE SERPL-SCNC: 105 MMOL/L — SIGNIFICANT CHANGE UP (ref 96–108)
CHLORIDE SERPL-SCNC: 106 MMOL/L — SIGNIFICANT CHANGE UP (ref 96–108)
CHLORIDE SERPL-SCNC: 107 MMOL/L — SIGNIFICANT CHANGE UP (ref 96–108)
CO2 BLDV-SCNC: 19 MMOL/L — LOW (ref 22–26)
CO2 BLDV-SCNC: 24 MMOL/L — SIGNIFICANT CHANGE UP (ref 22–26)
CO2 SERPL-SCNC: 19 MMOL/L — LOW (ref 22–31)
CO2 SERPL-SCNC: 19 MMOL/L — LOW (ref 22–31)
CO2 SERPL-SCNC: 21 MMOL/L — LOW (ref 22–31)
CO2 SERPL-SCNC: 22 MMOL/L — SIGNIFICANT CHANGE UP (ref 22–31)
CREAT SERPL-MCNC: 0.55 MG/DL — SIGNIFICANT CHANGE UP (ref 0.5–1.3)
CREAT SERPL-MCNC: 0.6 MG/DL — SIGNIFICANT CHANGE UP (ref 0.5–1.3)
CREAT SERPL-MCNC: 0.65 MG/DL — SIGNIFICANT CHANGE UP (ref 0.5–1.3)
CREAT SERPL-MCNC: 0.76 MG/DL — SIGNIFICANT CHANGE UP (ref 0.5–1.3)
EGFR: 103 ML/MIN/1.73M2 — SIGNIFICANT CHANGE UP
EGFR: 105 ML/MIN/1.73M2 — SIGNIFICANT CHANGE UP
EGFR: 108 ML/MIN/1.73M2 — SIGNIFICANT CHANGE UP
EGFR: 92 ML/MIN/1.73M2 — SIGNIFICANT CHANGE UP
ESTIMATED AVERAGE GLUCOSE: 318 MG/DL — HIGH (ref 68–114)
GAS PNL BLDV: 140 MMOL/L — SIGNIFICANT CHANGE UP (ref 136–145)
GAS PNL BLDV: 141 MMOL/L — SIGNIFICANT CHANGE UP (ref 136–145)
GAS PNL BLDV: SIGNIFICANT CHANGE UP
GLUCOSE BLDC GLUCOMTR-MCNC: 200 MG/DL — HIGH (ref 70–99)
GLUCOSE BLDC GLUCOMTR-MCNC: 245 MG/DL — HIGH (ref 70–99)
GLUCOSE BLDC GLUCOMTR-MCNC: 284 MG/DL — HIGH (ref 70–99)
GLUCOSE BLDC GLUCOMTR-MCNC: 296 MG/DL — HIGH (ref 70–99)
GLUCOSE BLDC GLUCOMTR-MCNC: 301 MG/DL — HIGH (ref 70–99)
GLUCOSE BLDC GLUCOMTR-MCNC: 308 MG/DL — HIGH (ref 70–99)
GLUCOSE BLDV-MCNC: 287 MG/DL — HIGH (ref 70–99)
GLUCOSE BLDV-MCNC: 349 MG/DL — HIGH (ref 70–99)
GLUCOSE SERPL-MCNC: 214 MG/DL — HIGH (ref 70–99)
GLUCOSE SERPL-MCNC: 258 MG/DL — HIGH (ref 70–99)
GLUCOSE SERPL-MCNC: 312 MG/DL — HIGH (ref 70–99)
GLUCOSE SERPL-MCNC: 462 MG/DL — CRITICAL HIGH (ref 70–99)
HCO3 BLDV-SCNC: 18 MMOL/L — LOW (ref 22–29)
HCO3 BLDV-SCNC: 22 MMOL/L — SIGNIFICANT CHANGE UP (ref 22–29)
HCT VFR BLD CALC: 30.7 % — LOW (ref 34.5–45)
HCT VFR BLDA CALC: 32 % — LOW (ref 34.5–46.5)
HCT VFR BLDA CALC: 33 % — LOW (ref 34.5–46.5)
HGB BLD CALC-MCNC: 10.7 G/DL — LOW (ref 11.7–16.1)
HGB BLD CALC-MCNC: 10.9 G/DL — LOW (ref 11.7–16.1)
HGB BLD-MCNC: 9.9 G/DL — LOW (ref 11.5–15.5)
LACTATE BLDV-MCNC: 0.8 MMOL/L — SIGNIFICANT CHANGE UP (ref 0.5–2)
LACTATE BLDV-MCNC: 1.6 MMOL/L — SIGNIFICANT CHANGE UP (ref 0.5–2)
MAGNESIUM SERPL-MCNC: 1.8 MG/DL — SIGNIFICANT CHANGE UP (ref 1.6–2.6)
MAGNESIUM SERPL-MCNC: 2 MG/DL — SIGNIFICANT CHANGE UP (ref 1.6–2.6)
MAGNESIUM SERPL-MCNC: 2 MG/DL — SIGNIFICANT CHANGE UP (ref 1.6–2.6)
MCHC RBC-ENTMCNC: 29.3 PG — SIGNIFICANT CHANGE UP (ref 27–34)
MCHC RBC-ENTMCNC: 32.2 GM/DL — SIGNIFICANT CHANGE UP (ref 32–36)
MCV RBC AUTO: 90.8 FL — SIGNIFICANT CHANGE UP (ref 80–100)
NRBC # BLD: 0 /100 WBCS — SIGNIFICANT CHANGE UP (ref 0–0)
PCO2 BLDV: 36 MMHG — LOW (ref 39–42)
PCO2 BLDV: 43 MMHG — HIGH (ref 39–42)
PH BLDV: 7.3 — LOW (ref 7.32–7.43)
PH BLDV: 7.32 — SIGNIFICANT CHANGE UP (ref 7.32–7.43)
PHOSPHATE SERPL-MCNC: 1.1 MG/DL — LOW (ref 2.5–4.5)
PHOSPHATE SERPL-MCNC: 1.4 MG/DL — LOW (ref 2.5–4.5)
PHOSPHATE SERPL-MCNC: 1.5 MG/DL — LOW (ref 2.5–4.5)
PLATELET # BLD AUTO: 203 K/UL — SIGNIFICANT CHANGE UP (ref 150–400)
PO2 BLDV: 24 MMHG — LOW (ref 25–45)
PO2 BLDV: 47 MMHG — HIGH (ref 25–45)
POTASSIUM BLDV-SCNC: 4.3 MMOL/L — SIGNIFICANT CHANGE UP (ref 3.5–5.1)
POTASSIUM BLDV-SCNC: 4.8 MMOL/L — SIGNIFICANT CHANGE UP (ref 3.5–5.1)
POTASSIUM SERPL-MCNC: 3.9 MMOL/L — SIGNIFICANT CHANGE UP (ref 3.5–5.3)
POTASSIUM SERPL-MCNC: 4.3 MMOL/L — SIGNIFICANT CHANGE UP (ref 3.5–5.3)
POTASSIUM SERPL-MCNC: 4.4 MMOL/L — SIGNIFICANT CHANGE UP (ref 3.5–5.3)
POTASSIUM SERPL-MCNC: 4.9 MMOL/L — SIGNIFICANT CHANGE UP (ref 3.5–5.3)
POTASSIUM SERPL-SCNC: 3.9 MMOL/L — SIGNIFICANT CHANGE UP (ref 3.5–5.3)
POTASSIUM SERPL-SCNC: 4.3 MMOL/L — SIGNIFICANT CHANGE UP (ref 3.5–5.3)
POTASSIUM SERPL-SCNC: 4.4 MMOL/L — SIGNIFICANT CHANGE UP (ref 3.5–5.3)
POTASSIUM SERPL-SCNC: 4.9 MMOL/L — SIGNIFICANT CHANGE UP (ref 3.5–5.3)
PROT SERPL-MCNC: 6.4 G/DL — SIGNIFICANT CHANGE UP (ref 6–8.3)
RBC # BLD: 3.38 M/UL — LOW (ref 3.8–5.2)
RBC # FLD: 12.9 % — SIGNIFICANT CHANGE UP (ref 10.3–14.5)
SAO2 % BLDV: 43.7 % — LOW (ref 67–88)
SAO2 % BLDV: 80.6 % — SIGNIFICANT CHANGE UP (ref 67–88)
SODIUM SERPL-SCNC: 139 MMOL/L — SIGNIFICANT CHANGE UP (ref 135–145)
SODIUM SERPL-SCNC: 140 MMOL/L — SIGNIFICANT CHANGE UP (ref 135–145)
SODIUM SERPL-SCNC: 143 MMOL/L — SIGNIFICANT CHANGE UP (ref 135–145)
SODIUM SERPL-SCNC: 143 MMOL/L — SIGNIFICANT CHANGE UP (ref 135–145)
THYROPEROXIDASE AB SERPL-ACNC: <10 IU/ML — SIGNIFICANT CHANGE UP
WBC # BLD: 6.47 K/UL — SIGNIFICANT CHANGE UP (ref 3.8–10.5)
WBC # FLD AUTO: 6.47 K/UL — SIGNIFICANT CHANGE UP (ref 3.8–10.5)

## 2024-01-20 PROCEDURE — 99223 1ST HOSP IP/OBS HIGH 75: CPT | Mod: GC

## 2024-01-20 PROCEDURE — 99232 SBSQ HOSP IP/OBS MODERATE 35: CPT

## 2024-01-20 RX ORDER — METOCLOPRAMIDE HCL 10 MG
10 TABLET ORAL ONCE
Refills: 0 | Status: COMPLETED | OUTPATIENT
Start: 2024-01-20 | End: 2024-01-20

## 2024-01-20 RX ORDER — SODIUM,POTASSIUM PHOSPHATES 278-250MG
1 POWDER IN PACKET (EA) ORAL ONCE
Refills: 0 | Status: COMPLETED | OUTPATIENT
Start: 2024-01-20 | End: 2024-01-20

## 2024-01-20 RX ORDER — INSULIN LISPRO 100/ML
5 VIAL (ML) SUBCUTANEOUS
Refills: 0 | Status: DISCONTINUED | OUTPATIENT
Start: 2024-01-20 | End: 2024-01-21

## 2024-01-20 RX ORDER — INSULIN LISPRO 100/ML
VIAL (ML) SUBCUTANEOUS
Refills: 0 | Status: DISCONTINUED | OUTPATIENT
Start: 2024-01-20 | End: 2024-01-24

## 2024-01-20 RX ORDER — INSULIN LISPRO 100/ML
VIAL (ML) SUBCUTANEOUS AT BEDTIME
Refills: 0 | Status: DISCONTINUED | OUTPATIENT
Start: 2024-01-20 | End: 2024-01-22

## 2024-01-20 RX ORDER — METOPROLOL TARTRATE 50 MG
1 TABLET ORAL
Qty: 0 | Refills: 0 | DISCHARGE

## 2024-01-20 RX ORDER — PANTOPRAZOLE SODIUM 20 MG/1
40 TABLET, DELAYED RELEASE ORAL DAILY
Refills: 0 | Status: DISCONTINUED | OUTPATIENT
Start: 2024-01-20 | End: 2024-01-22

## 2024-01-20 RX ORDER — ONDANSETRON 8 MG/1
4 TABLET, FILM COATED ORAL EVERY 6 HOURS
Refills: 0 | Status: DISCONTINUED | OUTPATIENT
Start: 2024-01-20 | End: 2024-01-20

## 2024-01-20 RX ORDER — INSULIN GLARGINE 100 [IU]/ML
15 INJECTION, SOLUTION SUBCUTANEOUS AT BEDTIME
Refills: 0 | Status: DISCONTINUED | OUTPATIENT
Start: 2024-01-20 | End: 2024-01-21

## 2024-01-20 RX ORDER — ENOXAPARIN SODIUM 100 MG/ML
40 INJECTION SUBCUTANEOUS EVERY 24 HOURS
Refills: 0 | Status: DISCONTINUED | OUTPATIENT
Start: 2024-01-20 | End: 2024-01-24

## 2024-01-20 RX ORDER — LISINOPRIL 2.5 MG/1
1 TABLET ORAL
Qty: 0 | Refills: 0 | DISCHARGE

## 2024-01-20 RX ORDER — ACETYLCYSTEINE 200 MG/ML
4 VIAL (ML) MISCELLANEOUS ONCE
Refills: 0 | Status: COMPLETED | OUTPATIENT
Start: 2024-01-20 | End: 2024-01-20

## 2024-01-20 RX ORDER — SODIUM CHLORIDE 9 MG/ML
1000 INJECTION, SOLUTION INTRAVENOUS
Refills: 0 | Status: DISCONTINUED | OUTPATIENT
Start: 2024-01-20 | End: 2024-01-22

## 2024-01-20 RX ORDER — ONDANSETRON 8 MG/1
4 TABLET, FILM COATED ORAL ONCE
Refills: 0 | Status: COMPLETED | OUTPATIENT
Start: 2024-01-20 | End: 2024-01-20

## 2024-01-20 RX ADMIN — Medication 5 UNIT(S): at 18:33

## 2024-01-20 RX ADMIN — Medication 1 PACKET(S): at 19:06

## 2024-01-20 RX ADMIN — Medication 4: at 03:41

## 2024-01-20 RX ADMIN — PANTOPRAZOLE SODIUM 40 MILLIGRAM(S): 20 TABLET, DELAYED RELEASE ORAL at 11:43

## 2024-01-20 RX ADMIN — Medication 2: at 18:33

## 2024-01-20 RX ADMIN — SODIUM CHLORIDE 100 MILLILITER(S): 9 INJECTION, SOLUTION INTRAVENOUS at 15:14

## 2024-01-20 RX ADMIN — Medication 4: at 08:38

## 2024-01-20 RX ADMIN — Medication 3: at 12:51

## 2024-01-20 RX ADMIN — Medication 10 MILLIGRAM(S): at 11:43

## 2024-01-20 RX ADMIN — Medication 4 MILLILITER(S): at 03:42

## 2024-01-20 RX ADMIN — Medication 85 MILLIMOLE(S): at 20:44

## 2024-01-20 RX ADMIN — Medication 30 MILLILITER(S): at 21:22

## 2024-01-20 RX ADMIN — Medication 1 PACKET(S): at 09:24

## 2024-01-20 RX ADMIN — ONDANSETRON 4 MILLIGRAM(S): 8 TABLET, FILM COATED ORAL at 01:45

## 2024-01-20 RX ADMIN — INSULIN GLARGINE 15 UNIT(S): 100 INJECTION, SOLUTION SUBCUTANEOUS at 21:22

## 2024-01-20 RX ADMIN — ONDANSETRON 4 MILLIGRAM(S): 8 TABLET, FILM COATED ORAL at 09:55

## 2024-01-20 NOTE — DISCHARGE NOTE PROVIDER - NSDCFUADDAPPT_GEN_ALL_CORE_FT
APPTS ARE READY TO BE MADE: [ ] YES    Best Family or Patient Contact (if needed):    Additional Information about above appointments (if needed):    1: PCP  2: Endocrine  3:     Other comments or requests:    APPTS ARE READY TO BE MADE: [ x] YES    Best Family or Patient Contact (if needed):    Additional Information about above appointments (if needed):    1: Internal Medicine  2: Endocrine Clinic  3:     Other comments or requests:    1) Please follow up with primary care doctor in 1-2 weeks of discharge. Please call [phone] to make an appointment.     2) Please follow up with endocrine after discharge. You will receive a call regarding your appointment.

## 2024-01-20 NOTE — DISCHARGE NOTE PROVIDER - NSDCCPCAREPLAN_GEN_ALL_CORE_FT
PRINCIPAL DISCHARGE DIAGNOSIS  Diagnosis: DKA (diabetic ketoacidosis)  Assessment and Plan of Treatment: You were admitted for Diabetic Ketoacidosis, a complication of Type 1 diabetes that can be caused by multiple factors including infection and missed insulin doses. In your case, your DKA was likely brought on by recent viral illness (influenza) and lack of insulin use despite not eating much leading up to your admission. Your glucose levels significantly improved with IV fluids, electrolyte repletion, and insulin dosing with close interval monitoring. It is important to monitor you glucose closely and follow up with your endocrinologist as scheduled to control your glucose while outpatient.

## 2024-01-20 NOTE — H&P ADULT - ATTENDING COMMENTS
58y F hx of T1DM, subclinical hypothyroidism, HTN, HLD, who presented to the ED for cough, general malaise, and n/v x3-4 days w/ influenza+ contacts found to be in DKA on admission  i/s/o likely flu and missed insulin dosing. Admitted to medicine s/p IVF 2L bolus and on maintenance IVF with basal/bolus insulin.     1. DKA 2/2 not using insulin in setting of URI/ ?Flu: Given Lantus 10U in ED, AG closing, Bicarb improving/ q6h BMP, VBG, BHB, Replete K as needed. Family contacts positive for Flu but pt does not want to be tested. Symptomatic treatment. IVF as above. MICU consulted, declined as not requiring insulin gtt. Reglan for nausea. Pantoprazole for epigastric burning, likely GERD. Endocrine consulted with recs noted as above- appreciate guidance.   2. HTN/ HLD: not taking OP meds, restart when acute issues resolved, BP wnl, statin on DC   3. Subclinical hypothyroidism: follow up endocrine [id number] wnl    Discussed plan with patient at bedside, doesn't want flu swab.   Other details per resident note as above

## 2024-01-20 NOTE — H&P ADULT - NSHPSOCIALHISTORY_GEN_ALL_CORE
CC:  Law Barrera is here today for   Chief Complaint   Patient presents with   â¢ Follow-up     testing 4/28/2022   pt stated continues to have upper epigastric pain , left middle side pain     Medications: medications verified and updated  Refills needed today?   NO  Allergies denies Latex allergy or sensitivity  Tobacco history: verified Denies alcohol, drug use.   Lives at home with , daughter and granddaughter.

## 2024-01-20 NOTE — H&P ADULT - PROBLEM SELECTOR PLAN 2
-BP target <130/80  -has history of elevated albumin/creatinine ratio in April 2023  -would recommend starting ACEi/ARB therapy on discharge - has history of elevated albumin/creatinine ratio in April 2023  - BPs WNL this admission w/ BP target <130/80  - Consider ACEi/ARB therapy on discharge (denies taking in years and reports having "white coat syndrome and not true HTN) - Has history of elevated albumin/creatinine ratio in April 2023  - BPs WNL this admission w/ BP target <130/80  - Consider ACEi/ARB therapy on discharge (denies taking in years and reports having "white coat syndrome and not true HTN)

## 2024-01-20 NOTE — DISCHARGE NOTE PROVIDER - NSDCMRMEDTOKEN_GEN_ALL_CORE_FT
Basaglar KwikPen 100 units/mL subcutaneous solution:  subcutaneous 14 unitsa once a day at bedtime  HumaLOG 100 units/mL subcutaneous solution:  subcutaneous 8-10 units before meals   Basaglar KwikPen 100 units/mL subcutaneous solution: 4 unit(s) subcutaneous once a day (at bedtime) 4 unit(s) subcutaneous once a day  Glucagon Emergency Kit for Low Blood Sugar 1 mg injection: 1 milligram(s) intramuscularly once a day as needed for Hypoglycemia 1 milligram(s) intramuscular once as needed for severe hypoglycemia, then call 911.  glucometer (per patient&#x27;s insurance): Test blood sugars four times a day. Dispense #1 glucometer.  HumaLOG KwikPen 100 units/mL injectable solution: 2 unit(s) injectable 3 times a day (with meals) 2 unit(s) subcutaneous 3 times a day (with meals). Please dont take if not eating.  Insulin Pen Needles, 4mm: 1 application subcutaneously 4 times a day. ** Use with insulin pen **  lancets: 1 application subcutaneously 4 times a day  test strips (per patient&#x27;s insurance): 1 application subcutaneously 4 times a day. ** Compatible with patient&#x27;s glucometer **

## 2024-01-20 NOTE — PROGRESS NOTE ADULT - ASSESSMENT
This is a 57 yo F /w a PMH of subclinical hypothyroidism type 1 diabetes, HTN, and HLD who presents with DKA, prompting endocrinology consult     #DM1  -advance po diet  -Increase Lantus to 15 units qhs  -Start admelog 5 units with meals  -low admelog correction scales before meals and bedtime  -carb consistent diet  -FSG before meals and bedtime with target glucose 100-180mg/dL  -low admelog correction scales before meals and bedtime  -hypoglycemia protocol in place    #discharge  -resume basal-bolus insulin, doses to be determined based on inpatient requirements  -follow up at Endocrine Practice at [address] [place] [address], [address], [place] [address]; Ph # [phone] with Dr. Blair    #HLD  -resume crestor 5mg daily on discharge    #HTN  -BP target <130/80  -has history of elevated albumin/creatinine ratio in April 2023  -would recommend starting ACEi/ARB therapy on discharge    #subclinical hypothyroidism   -last TSH 6.1,   -patients with DM1 at high risk for hypothyroidism due to Hashimoto's  -please check TSH, Free T4, and TPO antibodies        Ernesto Carter D.O  [phone]

## 2024-01-20 NOTE — DISCHARGE NOTE PROVIDER - NSDCCPTREATMENT_GEN_ALL_CORE_FT
PRINCIPAL PROCEDURE  Procedure: Chest xray, PA & lateral  Findings and Treatment: FINDINGS:  The heart is normal in size.  The lungs are clear.  There is no pneumothorax or pleural effusion.  No acute abnormalities in the visualized osseous structures.  IMPRESSION:  Clear lungs.

## 2024-01-20 NOTE — H&P ADULT - PROBLEM SELECTOR PLAN 4
-last TSH 6.1,   -patients with DM1 at high risk for hypothyroidism due to Hashimoto's  -please check TSH, Free T4, and TPO antibodies -TSH/T4  WNL this admission  - F/u TPO antibodies

## 2024-01-20 NOTE — CONSULT NOTE ADULT - SUBJECTIVE AND OBJECTIVE BOX
Patient:  RISA FERREIRA  [id number]    CHIEF COMPLAINT: n/v    HPI:  57 yo F /w a PMH of subclinical hypothyroidism, type 1 diabetes, HTN, and HLD who presents with DKA, MICU consulted re: need for insulin drip.     Pt reports URI symptom of productive cough over past 4 days w/ anorexia - at this time, she stopped taking both basal and premeal insulin as she was not eating. Also reports sick contacts of family members @ home including daughter, granddaughter. Home regimen is basaglar 11u nightly and admelog 6-10u sliding scale depending on meal (~6u AM, 8-10u PM). She further reports nausea/vomiting over the past day. In ED, pt received NS bolus 1L x2, initiated on NS @ 150ccs/hr. Also given 10u lantus, 10u admelog.       PAST MEDICAL & SURGICAL HISTORY:  DM (diabetes mellitus)  type 1- since       Diabetic retinopathy      Blindness of left eye      Anxiety      Hydronephrosis      Essential hypertension      Hyperlipidemia      UTI (urinary tract infection)  2017      H/O:   x 5      H/O tubal ligation        History of shoulder fracture  2017- s/p right shoulder repair          FAMILY HISTORY:  Family history of diabetes mellitus (Father)        SOCIAL HISTORY:    Allergies    No Known Allergies    Intolerances        HOME MEDICATIONS:  Basaglar 11u nightly  Admelog 6-10u sliding scale    REVIEW OF SYSTEMS:  [ ] Unable to assess ROS because ______  [ ] Negative except as stated in HPI  CONSTITUTIONAL: No fever, chills, night sweats, or fatigue  EYES: No eye pain, visual disturbances, or discharge  ENMT:  No difficulty hearing, tinnitus, vertigo; No sinus or throat pain  NECK: No pain or stiffness  BREASTS: No pain, masses, or nipple discharge  RESPIRATORY: (+) Cough. No wheezing, or hemoptysis; No shortness of breath  CARDIOVASCULAR: No chest pain, palpitations, dizziness, or leg swelling  GASTROINTESTINAL: (+) Nausea. No abdominal or epigastric pain. No vomiting, or hematemesis; No diarrhea or constipation. No melena or hematochezia.  GENITOURINARY: No dysuria, frequency, hematuria, or incontinence  NEUROLOGICAL: No headaches, memory loss, loss of strength, numbness, or tremors  SKIN: No itching, burning, rashes, or lesions   LYMPH NODES: No enlarged glands  ENDOCRINE: No heat or cold intolerance; No hair loss  MUSCULOSKELETAL: No joint pain or swelling; No muscle, back, or extremity pain  PSYCHIATRIC: No depression, anxiety, mood swings, or difficulty sleeping  HEME/LYMPH: No easy bruising, or bleeding gums  ALLERGY AND IMMUNOLOGIC: No hives or eczema    OBJECTIVE:  T(F): 98 (24 @ 23:29), Max: 98.2 (24 @ 20:02)  HR: 91 (24 @ 23:29) (85 - 95)  BP: 145/52 (24 @ 23:29) (144/64 - 156/59)  BP(mean): 80 (24 @ 23:29) (80 - 86)  ABP: --  ABP(mean): --  RR: 16 (24 @ 23:29) (16 - 20)  SpO2: 100% (24 @ 23:29) (98% - 100%)    I/O Summary 24H    CAPILLARY BLOOD GLUCOSE      POCT Blood Glucose.: 236 mg/dL (2024 23:06)      PHYSICAL EXAM:  GENERAL: NAD, lying in bed comfortably  HEAD:  Atraumatic, Normocephalic  EYES: EOMI, PERRLA, conjunctiva and sclera clear  ENT: Moist mucous membranes  NECK: Supple, No JVD  CHEST/LUNG: Clear to auscultation bilaterally; (+) Bilateral rhoncorous breath sounds, cleared w/ cough. No rales, wheezing, or rubs. Unlabored respirations  HEART: Regular rate and rhythm; No murmurs, rubs, or gallops  ABDOMEN: Bowel sounds present; Soft, Nontender, Nondistended. No hepatomegaly  EXTREMITIES:  2+ Peripheral Pulses, brisk capillary refill. No clubbing, cyanosis, or edema  NERVOUS SYSTEM:  Alert & Oriented X3, speech clear. No deficits   MSK: FROM all 4 extremities, full and equal strength  SKIN: No rashes or lesions    HOSPITAL MEDICATIONS:  MEDICATIONS  (STANDING):  acetylcysteine 10%  Inhalation 4 milliLiter(s) Inhalation Once  dextrose 5% + sodium chloride 0.9%. 1000 milliLiter(s) (150 mL/Hr) IV Continuous <Continuous>  dextrose 5%. 1000 milliLiter(s) (100 mL/Hr) IV Continuous <Continuous>  dextrose 5%. 1000 milliLiter(s) (50 mL/Hr) IV Continuous <Continuous>  dextrose 50% Injectable 25 Gram(s) IV Push once  dextrose 50% Injectable 12.5 Gram(s) IV Push once  dextrose 50% Injectable 25 Gram(s) IV Push once  glucagon  Injectable 1 milliGRAM(s) IntraMuscular once  insulin glargine Injectable (LANTUS) 10 Unit(s) SubCutaneous at bedtime  insulin lispro (ADMELOG) corrective regimen sliding scale   SubCutaneous every 4 hours    MEDICATIONS  (PRN):  dextrose Oral Gel 15 Gram(s) Oral once PRN Blood Glucose LESS THAN 70 milliGRAM(s)/deciliter      LABS:  CBC 24 @ 13:40                        12.3   8.49  )-----------( 215                   39.2       Hgb trend: 12.3 <--   WBC trend: 8.49 <--     CMP 24 @ 17:40    141  |  103  |  44<H>  ----------------------------<  330<H>  4.7   |  15<L>  |  0.75    Ca    8.4      24 @ 17:40  Phos  3.9       Mg     2.4         TPro  7.5  /  Alb  4.0  /  TBili  0.2  /  DBili  x   /  AST  22  /  ALT  20  /  AlkPhos  104        Serum Cr trend: 0.75 <-- , 0.84 <--       ABG Trend:     VBG Trend:   24 @ 12:50 - pH: 7.24  | pCO2: 39    | pO2: 22    | HCO3: 17    | Lactate: 2.5        MICROBIOLOGY:       RADIOLOGY:  [X] Reviewed and interpreted by me  CXR clear

## 2024-01-20 NOTE — H&P ADULT - NSHPLABSRESULTS_GEN_ALL_CORE
12.3   8.49  )-----------( 215      ( 19 Jan 2024 13:40 )             39.2     01-19    143  |  105  |  40<H>  ----------------------------<  258<H>  4.9   |  19<L>  |  0.76    Ca    8.8      19 Jan 2024 22:25  Phos  3.9     01-19  Mg     2.4     01-19    TPro  7.5  /  Alb  4.0  /  TBili  0.2  /  DBili  x   /  AST  22  /  ALT  20  /  AlkPhos  104  01-19          LIVER FUNCTIONS - ( 19 Jan 2024 13:40 )  Alb: 4.0 g/dL / Pro: 7.5 g/dL / ALK PHOS: 104 U/L / ALT: 20 U/L / AST: 22 U/L / GGT: x           Urinalysis Basic - ( 19 Jan 2024 22:25 )    Color: x / Appearance: x / SG: x / pH: x  Gluc: 258 mg/dL / Ketone: x  / Bili: x / Urobili: x   Blood: x / Protein: x / Nitrite: x   Leuk Esterase: x / RBC: x / WBC x   Sq Epi: x / Non Sq Epi: x / Bacteria: x

## 2024-01-20 NOTE — DISCHARGE NOTE PROVIDER - HOSPITAL COURSE
HPI:  59 yo F /w a PMH of subclinical hypothyroidism, type 1 diabetes, HTN, and HLD who presents with general malaise and productive cough over past 4 days w/ poor PO intake during this time. Pt also reports she stopped taking both all insulin as she was not eating. Also reports sick contacts of family members @ home including daughter, granddaughter testing + for influenza. Pt reports home regimen is basaglar 11u nightly and admelog 6-10u sliding scale depending on meal (~6u AM, 8-10u PM). She further reports nausea/vomiting over the past day and on admission. pt denies allergies to medications or food and denies any Hx of drug/alcohol use.     ED course:   Pt received NS bolus 1L x2, initiated on NS @ 150ccs/hr. Also given 10u lantus, 10u admelog. MICU consulted re: need for insulin drip, but not accepted and admitted to medicine and endocrine consulted.     Hospital Course:  Patient's anion gap closed shortly after arriving on medicine floors and nausea/vomiting improved with reglan and CC diet was restarted. Pt improved with basal/bolus regimen, repletion of electrolytes, and IVF bolus --> maintenance replacement. Pt had no further complications and remained HDS while in the hospital and upon date of dc.     Important Medication Changes and Reason:  - None/Lisinopril?     Active or Pending Issues Requiring Follow-up:  - Endocrine f/u for insulin regimen adjustment/management      Advanced Directives:   [ ] Full code  [ ] DNR  [ ] Hospice    Discharge Diagnoses:  DKA       HPI:  59 yo F /w a PMH of subclinical hypothyroidism, type 1 diabetes, HTN, and HLD who presents with general malaise and productive cough over past 4 days w/ poor PO intake during this time. Pt also reports she stopped taking both all insulin as she was not eating. Also reports sick contacts of family members @ home including daughter, granddaughter testing + for influenza. Pt reports home regimen is basaglar 11u nightly and admelog 6-10u sliding scale depending on meal (~6u AM, 8-10u PM). She further reports nausea/vomiting over the past day and on admission. pt denies allergies to medications or food and denies any Hx of drug/alcohol use.     ED course:   Pt received NS bolus 1L x2, initiated on NS @ 150ccs/hr. Also given 10u lantus, 10u admelog. MICU consulted re: need for insulin drip, but not accepted and admitted to medicine and endocrine consulted.     Hospital Course:  Patient's anion gap closed shortly after arriving on medicine floors and nausea/vomiting improved with reglan. Patient was transitioned to a basal/bolus regimen and she was started on a diet. Patient sugars improved, however she had some episodes of hypoglycemia that were atributed to the patient not eating adequately. Her basal/ bolus regimen continued to be adjusted until her sugars stabilized inpatient. She will be discharged home on 4 Units of Basaglar  and 2 units of Pre-Meal Humalog. She was enrolled in [facility] for medications. She will be reffered for outpatient PCP and Endocrine f/u.     Important Medication Changes and Reason:  - Basaglar 4 units daily   -Humalog 2 units 3 times a day with meals     Active or Pending Issues Requiring Follow-up:  - Endocrine f/u for insulin regimen adjustment/management    -PCP f/u at [facility]     Advanced Directives:   [ ] Full code  [ ] DNR  [ ] Hospice    Discharge Diagnoses:  DKA             HPI:  57 yo F with a PMH of subclinical hypothyroidism, type 1 diabetes, HTN, and HLD who presents with general malaise and productive cough over past 4 days w/ poor PO intake during this time. Pt also reports she stopped taking both all insulin as she was not eating. Also reports sick contacts of family members @ home including daughter, granddaughter testing + for influenza. Pt reports home regimen is basaglar 11u nightly and admelog 6-10u sliding scale depending on meal (~6u AM, 8-10u PM). She further reports nausea/vomiting over the past day and on admission.     ED course:   Pt received NS bolus 1L x2, initiated on NS @ 150ccs/hr. Also given 10u lantus, 10u admelog. Patient did not require insulin gtt and was admitted to medicine with endocrine consult for DKA.    Hospital Course:  Patient's AG closed shortly after arriving on medicine floors and nausea/vomiting improved with reglan. Patient was transitioned to a basal/bolus regimen and she was started on a diet. She then had episodes of hypoglycemia in the setting of poor PO intake. Her basal/bolus regimen continued to be adjusted until her sugars stabilized inpatient. She will be discharged home on 4U of Basaglar and 2 units of Pre-Meal Humalog. She was enrolled in [facility] for medications as she reported not having insurance. She was scheduled for endocrine follow up appointment. She was given referral to [facility] to establish care with PCP.     Important Medication Changes and Reason:  - Basaglar 4 units daily   - Humalog 2 units 3 times a day with meals     Active or Pending Issues Requiring Follow-up:  - Endocrine f/u for insulin regimen adjustment/management    - PCP f/u at [facility]     Discharge Diagnoses: DKA             58F with history of subclinical hypothyroidism, type 1 diabetes, HTN, and HLD who presents with general malaise and productive cough over past 4 days with poor PO intake during this time. Pt also reports she stopped taking both all insulin as she was not eating. Also reports sick contacts of family members @ home including daughter, granddaughter testing + for influenza. Pt reports home regimen is basaglar 11U nightly and admelog 6-10U sliding scale depending on meal (~6u AM, 8-10u PM). She further reports nausea/vomiting over the past day and on admission.     ED course:   Pt received NS bolus 1L x2, initiated on NS @ 150ccs/hr. Also given 10u lantus, 10u admelog. Patient did not require insulin gtt and was admitted to medicine with endocrine consult for DKA.    Hospital Course:  Patient's AG closed shortly after arriving on medicine floors and nausea/vomiting improved with reglan. Patient was transitioned to a basal/bolus regimen and she was started on a diet. She then had episodes of hypoglycemia in the setting of poor PO intake. Her basal/bolus regimen continued to be adjusted until her sugars stabilized inpatient. She will be discharged home on 4U of Basaglar and 2U of pre-meal Humalog. She was enrolled in [facility] for medications as she reported not having insurance. Her insulin and DM supplies were sent to [facility]. She was scheduled for endocrine follow up appointment. She was given referral to [facility] to establish care with PCP.     Patient's BP were elevated to 150-160s systolic inpatient. Discussed initiating ARB or ACEi with underlying history of HTN, however, patient refused as she stated having white coat hypertension. Patient will check her BP at home.     Patient also endorsed depressive symptoms in the setting of recent death of family (son) and will benefit from psychiatry referral outpatient.     Important Medication Changes and Reason:  - Basaglar 4 units daily   - Humalog 2 units 3 times a day with meals     Active or Pending Issues Requiring Follow-up:  - Endocrine f/u for insulin regimen adjustment/management    - PCP f/u at [facility]     Discharge Diagnoses: DKA   58F with history of subclinical hypothyroidism, type 1 diabetes, HTN, and HLD who presents with general malaise and productive cough over past 4 days with poor PO intake during this time. Pt also reports she stopped taking both all insulin as she was not eating. Also reports sick contacts of family members @ home including daughter, granddaughter testing + for influenza. Pt reports home regimen is basaglar 11U nightly and admelog 6-10U sliding scale depending on meal (~6u AM, 8-10u PM). She further reports nausea/vomiting over the past day and on admission.     ED course:   Pt received NS bolus 1L x2, initiated on NS @ 150ccs/hr. Also given 10u lantus, 10u admelog. Patient did not require insulin gtt and was admitted to medicine with endocrine consult for DKA.    Hospital Course:  Patient's AG closed shortly after arriving on medicine floors and nausea & vomiting improved with reglan. Patient was transitioned to a basal/bolus regimen and she was started on a diet. She then had episodes of hypoglycemia in the setting of poor PO intake. Her basal/bolus regimen continued to be adjusted until her sugars stabilized inpatient. She will be discharged home on 4U of Basaglar and 2U of pre-meal Humalog. She was enrolled in [facility] for medications as she reported not having insurance. Her insulin and DM supplies were sent to [facility]. She was scheduled for endocrine follow up appointment. She was given referral to [facility] to establish care with PCP.     Patient's BP were elevated to 150-160s systolic inpatient. Discussed initiating ARB or ACEi with underlying history of HTN, however, patient refused as she stated having white coat hypertension. Patient will check her BP at home.     Patient also endorsed depressive symptoms in the setting of recent death of family (son) and will benefit from psychiatry referral outpatient.     Important Medication Changes and Reason:  - Basaglar 4 units daily   - Humalog 2 units 3 times a day with meals     Active or Pending Issues Requiring Follow-up:  - Endocrine f/u for insulin regimen adjustment/management    - PCP f/u at [facility]     Discharge Diagnoses: DKA

## 2024-01-20 NOTE — DISCHARGE NOTE PROVIDER - CARE PROVIDERS DIRECT ADDRESSES
[email or web address]

## 2024-01-20 NOTE — DISCHARGE NOTE PROVIDER - PROVIDER TOKENS
PROVIDER:[TOKEN:[[id number]],FOLLOWUP:[1 week]] PROVIDER:[TOKEN:[[id number]],FOLLOWUP:[1 week]],FREE:[LAST:[Fellow Clinic],FIRST:[Endocrine],PHONE:[phone]],FAX:[(   )    -],ADDRESS:[[address]]]

## 2024-01-20 NOTE — DISCHARGE NOTE PROVIDER - NSFOLLOWUPCLINICS_GEN_ALL_ED_FT
[facility]  Internal Medicine  [address] [place] [address]  Phone: [phone]  Fax: [phone]  Follow Up Time: 1 week

## 2024-01-20 NOTE — PROGRESS NOTE ADULT - SUBJECTIVE AND OBJECTIVE BOX
Chief Complaint: Evaluating this 57 y/o F for uncontrolled Type 1 DM w/ hyperglycemia      Interval History: Glucose remains above goal.     MEDICATIONS  (STANDING):  dextrose 5%. 1000 milliLiter(s) (50 mL/Hr) IV Continuous <Continuous>  dextrose 5%. 1000 milliLiter(s) (100 mL/Hr) IV Continuous <Continuous>  dextrose 50% Injectable 25 Gram(s) IV Push once  dextrose 50% Injectable 25 Gram(s) IV Push once  dextrose 50% Injectable 12.5 Gram(s) IV Push once  enoxaparin Injectable 40 milliGRAM(s) SubCutaneous every 24 hours  glucagon  Injectable 1 milliGRAM(s) IntraMuscular once  lactated ringers. 1000 milliLiter(s) (100 mL/Hr) IV Continuous <Continuous>  pantoprazole  Injectable 40 milliGRAM(s) IV Push daily  potassium phosphate / sodium phosphate Powder (PHOS-NaK) 1 Packet(s) Oral once    MEDICATIONS  (PRN):  aluminum hydroxide/magnesium hydroxide/simethicone Suspension 30 milliLiter(s) Oral every 6 hours PRN Dyspepsia  dextrose Oral Gel 15 Gram(s) Oral once PRN Blood Glucose LESS THAN 70 milliGRAM(s)/deciliter      Allergies    No Known Allergies    Intolerances      Review of Systems:  Constitutional: No fever  Eyes: No blurry vision  Cardiovascular: No chest pain  Respiratory: No SOB  GI: No abdominal pain, No nausea, No vomiting  Endocrine: as noted in HPI    All other negative      PHYSICAL EXAM:  VITALS: T(C): 36.6 (01-20-24 @ 13:45)  T(F): 97.9 (01-20-24 @ 13:45), Max: 98.8 (01-20-24 @ 04:43)  HR: 81 (01-20-24 @ 13:45) (81 - 95)  BP: 161/79 (01-20-24 @ 13:45) (105/62 - 161/79)  RR:  (16 - 18)  SpO2:  (91% - 100%)  Wt(kg): --  GENERAL: NAD at this time  EYES: EOMI, No proptosis  HEENT:  Atraumatic, Normocephalic,   RESPIRATORY: Clear to auscultation bilaterally, full excursion, non labored  CARDIOVASCULAR: Regular rhythm; normal S1/S2, no peripheral edema  GI: Soft, nontender, non distended, normal bowel sounds  SKIN: Warm and dry  PSYCH: normal affect, normal mood      POCT Blood Glucose.: 284 mg/dL (01-20-24 @ 14:12)  POCT Blood Glucose.: 296 mg/dL (01-20-24 @ 12:02)  POCT Blood Glucose.: 308 mg/dL (01-20-24 @ 08:26)  POCT Blood Glucose.: 301 mg/dL (01-20-24 @ 03:27)  POCT Blood Glucose.: 236 mg/dL (01-19-24 @ 23:06)  POCT Blood Glucose.: 250 mg/dL (01-19-24 @ 22:16)  POCT Blood Glucose.: 297 mg/dL (01-19-24 @ 19:47)  POCT Blood Glucose.: 305 mg/dL (01-19-24 @ 18:18)  POCT Blood Glucose.: 327 mg/dL (01-19-24 @ 17:05)  POCT Blood Glucose.: 403 mg/dL (01-19-24 @ 15:12)  POCT Blood Glucose.: 421 mg/dL (01-19-24 @ 14:35)  POCT Blood Glucose.: 432 mg/dL (01-19-24 @ 12:55)  POCT Blood Glucose.: 429 mg/dL (01-19-24 @ 12:17)  POCT Blood Glucose.: 419 mg/dL (01-19-24 @ 12:16)        01-20    140  |  107  |  24<H>  ----------------------------<  462<HH>  4.3   |  21<L>  |  0.60    eGFR: 105    Ca    8.0<L>      01-20  Mg     2.0     01-20  Phos  1.4     01-20    TPro  6.4  /  Alb  3.5  /  TBili  0.2  /  DBili  x   /  AST  16  /  ALT  14  /  AlkPhos  84  01-20        Thyroid Function Tests:  01-19 @ 17:44 TSH 1.10 FreeT4 0.9 T3 -- Anti TPO -- Anti Thyroglobulin Ab -- TSI --                           Chief Complaint: Evaluating this 57 y/o F for uncontrolled Type 1 DM w/ hyperglycemia      Interval History: Glucose remains above goal. Reports worsening acid reflux symptoms.     MEDICATIONS  (STANDING):  dextrose 5%. 1000 milliLiter(s) (50 mL/Hr) IV Continuous <Continuous>  dextrose 5%. 1000 milliLiter(s) (100 mL/Hr) IV Continuous <Continuous>  dextrose 50% Injectable 25 Gram(s) IV Push once  dextrose 50% Injectable 25 Gram(s) IV Push once  dextrose 50% Injectable 12.5 Gram(s) IV Push once  enoxaparin Injectable 40 milliGRAM(s) SubCutaneous every 24 hours  glucagon  Injectable 1 milliGRAM(s) IntraMuscular once  lactated ringers. 1000 milliLiter(s) (100 mL/Hr) IV Continuous <Continuous>  pantoprazole  Injectable 40 milliGRAM(s) IV Push daily  potassium phosphate / sodium phosphate Powder (PHOS-NaK) 1 Packet(s) Oral once    MEDICATIONS  (PRN):  aluminum hydroxide/magnesium hydroxide/simethicone Suspension 30 milliLiter(s) Oral every 6 hours PRN Dyspepsia  dextrose Oral Gel 15 Gram(s) Oral once PRN Blood Glucose LESS THAN 70 milliGRAM(s)/deciliter      Allergies    No Known Allergies    Intolerances      Review of Systems:  Constitutional: No fever  Eyes: No blurry vision  Cardiovascular: No chest pain  Respiratory: No SOB  GI: No abdominal pain, No nausea, No vomiting  Endocrine: as noted in HPI    All other negative      PHYSICAL EXAM:  VITALS: T(C): 36.6 (01-20-24 @ 13:45)  T(F): 97.9 (01-20-24 @ 13:45), Max: 98.8 (01-20-24 @ 04:43)  HR: 81 (01-20-24 @ 13:45) (81 - 95)  BP: 161/79 (01-20-24 @ 13:45) (105/62 - 161/79)  RR:  (16 - 18)  SpO2:  (91% - 100%)  Wt(kg): --  GENERAL: NAD at this time  EYES: EOMI, No proptosis  HEENT:  Atraumatic, Normocephalic,   RESPIRATORY: Clear to auscultation bilaterally, full excursion, non labored  CARDIOVASCULAR: Regular rhythm; normal S1/S2, no peripheral edema  GI: Soft, nontender, non distended, normal bowel sounds  SKIN: Warm and dry  PSYCH: normal affect, normal mood      POCT Blood Glucose.: 284 mg/dL (01-20-24 @ 14:12)  POCT Blood Glucose.: 296 mg/dL (01-20-24 @ 12:02)  POCT Blood Glucose.: 308 mg/dL (01-20-24 @ 08:26)  POCT Blood Glucose.: 301 mg/dL (01-20-24 @ 03:27)  POCT Blood Glucose.: 236 mg/dL (01-19-24 @ 23:06)  POCT Blood Glucose.: 250 mg/dL (01-19-24 @ 22:16)  POCT Blood Glucose.: 297 mg/dL (01-19-24 @ 19:47)  POCT Blood Glucose.: 305 mg/dL (01-19-24 @ 18:18)  POCT Blood Glucose.: 327 mg/dL (01-19-24 @ 17:05)  POCT Blood Glucose.: 403 mg/dL (01-19-24 @ 15:12)  POCT Blood Glucose.: 421 mg/dL (01-19-24 @ 14:35)  POCT Blood Glucose.: 432 mg/dL (01-19-24 @ 12:55)  POCT Blood Glucose.: 429 mg/dL (01-19-24 @ 12:17)  POCT Blood Glucose.: 419 mg/dL (01-19-24 @ 12:16)        01-20    140  |  107  |  24<H>  ----------------------------<  462<HH>  4.3   |  21<L>  |  0.60    eGFR: 105    Ca    8.0<L>      01-20  Mg     2.0     01-20  Phos  1.4     01-20    TPro  6.4  /  Alb  3.5  /  TBili  0.2  /  DBili  x   /  AST  16  /  ALT  14  /  AlkPhos  84  01-20        Thyroid Function Tests:  01-19 @ 17:44 TSH 1.10 FreeT4 0.9 T3 -- Anti TPO -- Anti Thyroglobulin Ab -- TSI --

## 2024-01-20 NOTE — H&P ADULT - HISTORY OF PRESENT ILLNESS
59 yo F /w a PMH of subclinical hypothyroidism, type 1 diabetes, HTN, and HLD who presents with DKA, MICU consulted re: need for insulin drip.     Pt reports URI symptom of productive cough over past 4 days w/ anorexia - at this time, she stopped taking both basal and premeal insulin as she was not eating. Also reports sick contacts of family members @ home including daughter, granddaughter. Home regimen is basaglar 11u nightly and admelog 6-10u sliding scale depending on meal (~6u AM, 8-10u PM). She further reports nausea/vomiting over the past day. In ED, pt received NS bolus 1L x2, initiated on NS @ 150ccs/hr. Also given 10u lantus, 10u admelog.  59 yo F /w a PMH of subclinical hypothyroidism, type 1 diabetes, HTN, and HLD who presents with general malaise and productive cough over past 4 days w/ poor PO intake during this time. Pt also reports she stopped taking both all insulin as she was not eating. Also reports sick contacts of family members @ home including daughter, granddaughter testing + for influenza. Pt reports home regimen is basaglar 11u nightly and admelog 6-10u sliding scale depending on meal (~6u AM, 8-10u PM). She further reports nausea/vomiting over the past day and on admission. pt denies allergies to medications or food and denies any Hx of drug/alcohol use.     ED course:   Pt received NS bolus 1L x2, initiated on NS @ 150ccs/hr. Also given 10u lantus, 10u admelog. MICU consulted re: need for insulin drip, but not accepted and admitted to medicine and endocrine consulted.

## 2024-01-20 NOTE — H&P ADULT - ASSESSMENT
58y F hx of T1DM presenting for cough, found to be in DKA i/s/o likely flu and missed insulin dosing.  58y F hx of T1DM, subclinical hypothyroidism, HTN, HLD, who presented to the ED for cough, general malaise, and n/v x3-4 days w/ influenza+ contacts found to be in DKA on admission  i/s/o likely flu and missed insulin dosing. Admitted to medicine s/p IVF 2L bolus and on maintenance IVF with basal/bolus insulin.

## 2024-01-20 NOTE — H&P ADULT - NSHPPHYSICALEXAM_GEN_ALL_CORE
GENERAL: NAD. Thin body habitus  HEAD:  Atraumatic, Normocephalic  EYES: EOMI, conjunctiva and sclera clear  ENT: Moist mucous membranes  NECK: Supple  CHEST/LUNG: Unlabored respirations. wet cough; b/l coarse crackles.  HEART: Regular rate and rhythm. No murmurs, rubs, or gallops  ABDOMEN: Soft, nondistended, nontender  EXTREMITIES:  No cyanosis or edema. Brisk capillary refill  NERVOUS SYSTEM:  No focal deficits. A&Ox3  SKIN: No rashes or lesions GENERAL: NAD. Thin body habitus.  HEAD:  Atraumatic, Normocephalic  EYES: EOM grossly intact, conjunctiva and sclera clear  ENT: Moist mucous membranes  NECK: Supple  CHEST/LUNG: Unlabored respirations. wet cough; b/l coarse crackles.  HEART: Regular rate and rhythm. No murmurs, rubs, or gallops  ABDOMEN: Soft, nondistended, nontender  EXTREMITIES:  No cyanosis or edema. Brisk capillary refill  NERVOUS SYSTEM:  No focal deficits. A&Ox3  SKIN: No rashes or lesions appreciated.

## 2024-01-20 NOTE — H&P ADULT - PROBLEM SELECTOR PLAN 1
-Initial labs: pH 7.24, pCO2 39, pO2 29 on VBG; BHB >8, Bicarb 14, AG 29  -s/p 10u lantus, 10u admelog  -Labs following intervention: BHB 6.6, Bicarb 15, AG 23  -Hyperglycemia, ketosis improving w/ resumption of insulin regimen  >repeat VBG to assess for improvement in acid-base status  >f/u endo recs, c/w basal lantus  >q4h FS BG to ensure improved hyperglycemia over next 12-24h  >q6h BMP, VBG to assess for hyperkalemia, acidosis, anion gap and improvement in those    ENDO recs as below  #post-DKA DM1 plan  -c/w lantus q24hr & start admelog 2 units with breakfast and 4 units with lunch and dinner  -low admelog correction scales before meals and bedtime  -FSG before meals and bedtime with target glucose 100-180mg/dL  -low admelog correction scales before meals and bedtime  -hypoglycemia protocol in place  #discharge  -resume basal-bolus insulin, doses to be determined based on inpatient requirements  -follow up at [facility] Practice at [address] [place] [address], [address], [place] [address]; Ph # [phone] with Dr. Blair -Initial labs: pH 7.24, pCO2 39, pO2 29 on VBG; BHB >8, Bicarb 14, AG 29  -s/p 10u lantus, 10u admelog  -Labs following intervention: BHB 6.6, Bicarb 15, AG 23  --> AG now 15 and HCO3 22   -Hyperglycemia, ketosis improving w/ resumption of insulin regimen  - Repeat VBG --> PH 7.32 w/ PCO2 36, improving from prior   - C/w q4h FS BG to ensure improved hyperglycemia over next 12-24h  - C/w IVIS >q6h BMP, VBG to assess for hyperkalemia, acidosis, AG etc.  - Reglan trial 10mg IVP this AM (Zofran ineffective in ED); QTc this admission 447    #post-DKA DM1 plan endo recs as below & appreciated  - c/w lantus q24hr & start admelog 2 units with breakfast and 4 units with lunch and dinner  - low admelog correction scales before meals and bedtime  - FSG before meals and bedtime with target glucose 100-180mg/dL  - low admelog correction scales before meals and bedtime    Plan for dc as below per endo, recs appreciated  - Resume basal-bolus insulin, doses to be determined based on inpatient requirements  - Follow up at Endocrine Practice at [address] [place] [address], [address], [place] [address]; Ph # [phone] with Dr. Blair

## 2024-01-20 NOTE — H&P ADULT - NSHPREVIEWOFSYSTEMS_GEN_ALL_CORE
REVIEW OF SYSTEMS:    CONSTITUTIONAL: Denies weakness, fevers or chills  EYES/ENT: Denies visual changes;  No vertigo or throat pain   NECK: Denies pain or stiffness  RESPIRATORY: Denies cough, wheezing, hemoptysis; No shortness of breath  CARDIOVASCULAR: Denies chest pain or palpitations  GASTROINTESTINAL: Denies abdominal or epigastric pain. No nausea, vomiting, or hematemesis; No diarrhea or constipation. No melena or hematochezia.  GENITOURINARY: Denies dysuria, frequency or hematuria  NEUROLOGICAL: Denies numbness or weakness  SKIN: Denies itching, rashes REVIEW OF SYSTEMS:    CONSTITUTIONAL: Denies weakness, fevers or chills. + general malaise.  EYES/ENT: Some blurry vision this AM;  Denies vertigo or throat pain   NECK: Denies pain or stiffness.  RESPIRATORY: Denies cough, wheezing, hemoptysis; Denies shortness of breath  CARDIOVASCULAR: Denies chest pain or palpitations.   GASTROINTESTINAL: + Mild epigastric pain described as "burning." + nausea w/ some NBNB vomiting. Poor appetite and no PO intake over last 48 hours. Denies hematemesis; Denies diarrhea or constipation. Denies melena or hematochezia.  GENITOURINARY: Denies dysuria, frequency/retention/hesitancy or hematuria.  NEUROLOGICAL: Denies numbness or weakness.   SKIN: Denies itching, rashes, or other skin changes.

## 2024-01-20 NOTE — DISCHARGE NOTE PROVIDER - CARE PROVIDER_API CALL
Janes Dasilva.  Internal Medicine  [address] [place] [address]  Phone: [phone]  Fax: ()-  Follow Up Time: 1 week   Janes Dasilva.  Internal Medicine  [address] [place] [address]  Phone: [phone]  Fax: ()-  Follow Up Time: 1 week    [facility], Endocrine  [facility]- [address] [place] [address]  Phone: [phone]  Fax: (   )    -  Follow Up Time:

## 2024-01-20 NOTE — CONSULT NOTE ADULT - ASSESSMENT
58y F hx of T1DM presenting for cough, found to be in DKA i/s/o likely flu and missed insulin dosing.     #DKA  -Initial labs: pH 7.24, pCO2 39, pO2 29 on VBG; BHB >8, Bicarb 14, AG 29  -s/p 10u lantus, 10u admelog  -Labs following intervention: BHB 6.6, Bicarb 15, AG 23  -Hyperglycemia, ketosis improving w/ resumption of insulin regiment  >repeat VBG to assess for improvement in acid-base status  >f/u endo recs, c/w basal lantus  >q4h FS BG to ensure improved hyperglycemia over next 12-24h  >q6h BMP, VBG to assess for hyperkalemia, acidosis, anion gap and improvement in those    No need for MICU admission at this time, please re-consult as needed 58y F hx of T1DM presenting for cough, found to be in DKA i/s/o likely flu and missed insulin dosing.     #DKA  -Initial labs: pH 7.24, pCO2 39, pO2 29 on VBG; BHB >8, Bicarb 14, AG 29  -s/p 10u lantus, 10u admelog  -Labs following intervention: BHB 6.6, Bicarb 15, AG 23  -Hyperglycemia, ketosis improving w/ resumption of insulin regimen  >repeat VBG to assess for improvement in acid-base status  >f/u endo recs, c/w basal lantus  >q4h FS BG to ensure improved hyperglycemia over next 12-24h  >q6h BMP, VBG to assess for hyperkalemia, acidosis, anion gap and improvement in those    No need for MICU admission at this time, please re-consult as needed

## 2024-01-20 NOTE — H&P ADULT - PROBLEM SELECTOR PLAN 5
DVT PPx: Improve Score: Heparin 5000 subq Q8h/Lovenox 40mg QD   Diet: CC   Bowel Regimen: Last BM:    Miralax/Senna/Mag Citrate/ Lactulose/Enema  Code: Full/DNR/DNI   Dispo: PT/OT Pending Hospital Course      --------------------------------------------------------------  Edilberto Langley MD  PGY-1, Internal Medicine  [employer] Teams preferred  -------------------------------------------------------------- DVT PPx: Lovenox 40mg QD   Diet: NPO for now --> CC   Bowel Regimen: None for now   Code: Full    Dispo: Pending PT/OT & Hospital Course      --------------------------------------------------------------  Edilberto Langley MD  PGY-1, Internal Medicine  [employer] Teams preferred  -------------------------------------------------------------- DVT PPx: Lovenox 40mg QD   Diet: NPO for now --> CC   Bowel Regimen: PRN miralax Qd   Code: Full    Dispo: Pending PT/OT & Hospital Course      --------------------------------------------------------------  Edilberto Langley MD  PGY-1, Internal Medicine  [employer] Teams preferred  --------------------------------------------------------------

## 2024-01-20 NOTE — CONSULT NOTE ADULT - ATTENDING COMMENTS
Patient seen and examined.  Agree with resident note as above.  Patient with hx as noted including DM1 on basal/bolus insulin with multiple flu+ sick contacts at home who presents with 4d of viral sx and has not been taking her insulin.  On arrival to ER patient has stable vitals but labs c/w DKA.  Patient given basal and short acting insulin, with improvement in ketosis/acidemia/hyperglycemia.  Receiving IVF.  On exam, patient is coughing frequently but not in respiratory distress, and exam is otherwise benign.    Recs as above- basal insulin q24h, serial FSG/chem to ensure continued improvement in ketosis.  Continue IVF.  F/u with endo regarding continued management.  No need for MICU at this time.
Agree with assessment and plan as above by Dr. Edmondson. Reviewed all pertinent labs, glucose values, and imaging studies. Modifications made as indicated above. Pt. with severely uncontrolled diabetes with DKA in the setting of missed doses of insulin. Start basal bolus regimen as above once acidosis resolves and glucose ideally < 250. Already received 10 units of lispro. Monitor for risk of hypoglycemia given sensitivity to insulin based on home doses. Trend bicarb and AG. If not improving can consider insulin gtt. Follow-up as outpatient with Dr. Blair.     Ernesto Carter D.O  [phone]

## 2024-01-21 LAB
ANION GAP SERPL CALC-SCNC: 14 MMOL/L — SIGNIFICANT CHANGE UP (ref 5–17)
BASOPHILS # BLD AUTO: 0 K/UL — SIGNIFICANT CHANGE UP (ref 0–0.2)
BASOPHILS NFR BLD AUTO: 0 % — SIGNIFICANT CHANGE UP (ref 0–2)
BUN SERPL-MCNC: 14 MG/DL — SIGNIFICANT CHANGE UP (ref 7–23)
CALCIUM SERPL-MCNC: 8.3 MG/DL — LOW (ref 8.4–10.5)
CHLORIDE SERPL-SCNC: 103 MMOL/L — SIGNIFICANT CHANGE UP (ref 96–108)
CO2 SERPL-SCNC: 22 MMOL/L — SIGNIFICANT CHANGE UP (ref 22–31)
CREAT SERPL-MCNC: 0.54 MG/DL — SIGNIFICANT CHANGE UP (ref 0.5–1.3)
EGFR: 108 ML/MIN/1.73M2 — SIGNIFICANT CHANGE UP
EOSINOPHIL # BLD AUTO: 0 K/UL — SIGNIFICANT CHANGE UP (ref 0–0.5)
EOSINOPHIL NFR BLD AUTO: 0 % — SIGNIFICANT CHANGE UP (ref 0–6)
GLUCOSE BLDC GLUCOMTR-MCNC: 119 MG/DL — HIGH (ref 70–99)
GLUCOSE BLDC GLUCOMTR-MCNC: 146 MG/DL — HIGH (ref 70–99)
GLUCOSE BLDC GLUCOMTR-MCNC: 81 MG/DL — SIGNIFICANT CHANGE UP (ref 70–99)
GLUCOSE BLDC GLUCOMTR-MCNC: 84 MG/DL — SIGNIFICANT CHANGE UP (ref 70–99)
GLUCOSE SERPL-MCNC: 183 MG/DL — HIGH (ref 70–99)
HCT VFR BLD CALC: 30.7 % — LOW (ref 34.5–45)
HGB BLD-MCNC: 9.8 G/DL — LOW (ref 11.5–15.5)
LYMPHOCYTES # BLD AUTO: 1.44 K/UL — SIGNIFICANT CHANGE UP (ref 1–3.3)
LYMPHOCYTES # BLD AUTO: 22.6 % — SIGNIFICANT CHANGE UP (ref 13–44)
MAGNESIUM SERPL-MCNC: 1.8 MG/DL — SIGNIFICANT CHANGE UP (ref 1.6–2.6)
MANUAL SMEAR VERIFICATION: SIGNIFICANT CHANGE UP
MCHC RBC-ENTMCNC: 28.8 PG — SIGNIFICANT CHANGE UP (ref 27–34)
MCHC RBC-ENTMCNC: 31.9 GM/DL — LOW (ref 32–36)
MCV RBC AUTO: 90.3 FL — SIGNIFICANT CHANGE UP (ref 80–100)
MONOCYTES # BLD AUTO: 0.72 K/UL — SIGNIFICANT CHANGE UP (ref 0–0.9)
MONOCYTES NFR BLD AUTO: 11.3 % — SIGNIFICANT CHANGE UP (ref 2–14)
NEUTROPHILS # BLD AUTO: 4.22 K/UL — SIGNIFICANT CHANGE UP (ref 1.8–7.4)
NEUTROPHILS NFR BLD AUTO: 65.2 % — SIGNIFICANT CHANGE UP (ref 43–77)
NEUTS BAND # BLD: 0.9 % — SIGNIFICANT CHANGE UP (ref 0–8)
NRBC # BLD: 1 /100 WBCS — HIGH (ref 0–0)
PHOSPHATE SERPL-MCNC: 2.8 MG/DL — SIGNIFICANT CHANGE UP (ref 2.5–4.5)
PLAT MORPH BLD: NORMAL — SIGNIFICANT CHANGE UP
PLATELET # BLD AUTO: 223 K/UL — SIGNIFICANT CHANGE UP (ref 150–400)
POTASSIUM SERPL-MCNC: 3.4 MMOL/L — LOW (ref 3.5–5.3)
POTASSIUM SERPL-SCNC: 3.4 MMOL/L — LOW (ref 3.5–5.3)
RBC # BLD: 3.4 M/UL — LOW (ref 3.8–5.2)
RBC # FLD: 12.9 % — SIGNIFICANT CHANGE UP (ref 10.3–14.5)
RBC BLD AUTO: SIGNIFICANT CHANGE UP
SODIUM SERPL-SCNC: 139 MMOL/L — SIGNIFICANT CHANGE UP (ref 135–145)
WBC # BLD: 6.39 K/UL — SIGNIFICANT CHANGE UP (ref 3.8–10.5)
WBC # FLD AUTO: 6.39 K/UL — SIGNIFICANT CHANGE UP (ref 3.8–10.5)

## 2024-01-21 PROCEDURE — 71045 X-RAY EXAM CHEST 1 VIEW: CPT | Mod: 26

## 2024-01-21 PROCEDURE — 99232 SBSQ HOSP IP/OBS MODERATE 35: CPT

## 2024-01-21 PROCEDURE — 99232 SBSQ HOSP IP/OBS MODERATE 35: CPT | Mod: GC

## 2024-01-21 RX ORDER — INSULIN LISPRO 100/ML
3 VIAL (ML) SUBCUTANEOUS
Refills: 0 | Status: DISCONTINUED | OUTPATIENT
Start: 2024-01-21 | End: 2024-01-24

## 2024-01-21 RX ORDER — MAGNESIUM SULFATE 500 MG/ML
2 VIAL (ML) INJECTION ONCE
Refills: 0 | Status: COMPLETED | OUTPATIENT
Start: 2024-01-21 | End: 2024-01-21

## 2024-01-21 RX ORDER — INSULIN GLARGINE 100 [IU]/ML
12 INJECTION, SOLUTION SUBCUTANEOUS AT BEDTIME
Refills: 0 | Status: DISCONTINUED | OUTPATIENT
Start: 2024-01-21 | End: 2024-01-22

## 2024-01-21 RX ORDER — POTASSIUM CHLORIDE 20 MEQ
40 PACKET (EA) ORAL ONCE
Refills: 0 | Status: COMPLETED | OUTPATIENT
Start: 2024-01-21 | End: 2024-01-21

## 2024-01-21 RX ADMIN — Medication 100 MILLIGRAM(S): at 22:44

## 2024-01-21 RX ADMIN — INSULIN GLARGINE 12 UNIT(S): 100 INJECTION, SOLUTION SUBCUTANEOUS at 22:22

## 2024-01-21 RX ADMIN — Medication 40 MILLIEQUIVALENT(S): at 11:01

## 2024-01-21 RX ADMIN — Medication 100 MILLIGRAM(S): at 14:34

## 2024-01-21 RX ADMIN — Medication 5 UNIT(S): at 08:10

## 2024-01-21 RX ADMIN — PANTOPRAZOLE SODIUM 40 MILLIGRAM(S): 20 TABLET, DELAYED RELEASE ORAL at 12:02

## 2024-01-21 RX ADMIN — Medication 25 GRAM(S): at 07:50

## 2024-01-21 NOTE — PROGRESS NOTE ADULT - SUBJECTIVE AND OBJECTIVE BOX
seen earlier today     Chief Complaint: Diabetes Mellitus follow up    INTERVAL HX: tolerating <50% of tray patient reports some nausea this am due to potassium replacement drink (Klor con), no evidence of dka today       Review of Systems:  General: As above  GI: No nausea, vomiting  Endocrine: no  S&Sx of hypoglycemia    Allergies    No Known Allergies    Intolerances      MEDICATIONS  (STANDING):  dextrose 5%. 1000 milliLiter(s) (50 mL/Hr) IV Continuous <Continuous>  dextrose 5%. 1000 milliLiter(s) (100 mL/Hr) IV Continuous <Continuous>  dextrose 50% Injectable 25 Gram(s) IV Push once  dextrose 50% Injectable 12.5 Gram(s) IV Push once  dextrose 50% Injectable 25 Gram(s) IV Push once  enoxaparin Injectable 40 milliGRAM(s) SubCutaneous every 24 hours  glucagon  Injectable 1 milliGRAM(s) IntraMuscular once  insulin glargine Injectable (LANTUS) 15 Unit(s) SubCutaneous at bedtime  insulin lispro (ADMELOG) corrective regimen sliding scale   SubCutaneous three times a day before meals  insulin lispro (ADMELOG) corrective regimen sliding scale   SubCutaneous at bedtime  insulin lispro Injectable (ADMELOG) 5 Unit(s) SubCutaneous three times a day before meals  lactated ringers. 1000 milliLiter(s) (100 mL/Hr) IV Continuous <Continuous>  pantoprazole  Injectable 40 milliGRAM(s) IV Push daily        insulin glargine Injectable (LANTUS)   15 Unit(s) SubCutaneous (01-20-24 @ 21:22)    insulin lispro (ADMELOG) corrective regimen sliding scale   2 Unit(s) SubCutaneous (01-20-24 @ 18:33)    insulin lispro Injectable (ADMELOG)   5 Unit(s) SubCutaneous (01-21-24 @ 08:10)   5 Unit(s) SubCutaneous (01-20-24 @ 18:33)        PHYSICAL EXAM:  VITALS: T(C): 37 (01-21-24 @ 10:44)  T(F): 98.6 (01-21-24 @ 10:44), Max: 98.7 (01-21-24 @ 04:55)  HR: 84 (01-21-24 @ 10:44) (80 - 89)  BP: 151/70 (01-21-24 @ 10:44) (150/69 - 167/72)  RR:  (18 - 19)  SpO2:  (95% - 99%)  Wt(kg): --  GENERAL: NAD, sitting in bed   Respiratory: Respirations unlabored   Extremities: Warm, no edema  NEURO: Alert , appropriate     LABS:  POCT Blood Glucose.: 84 mg/dL (01-21-24 @ 12:06)  POCT Blood Glucose.: 146 mg/dL (01-21-24 @ 08:00)  POCT Blood Glucose.: 200 mg/dL (01-20-24 @ 21:14)  POCT Blood Glucose.: 245 mg/dL (01-20-24 @ 17:36)  POCT Blood Glucose.: 284 mg/dL (01-20-24 @ 14:12)  POCT Blood Glucose.: 296 mg/dL (01-20-24 @ 12:02)  POCT Blood Glucose.: 308 mg/dL (01-20-24 @ 08:26)  POCT Blood Glucose.: 301 mg/dL (01-20-24 @ 03:27)  POCT Blood Glucose.: 236 mg/dL (01-19-24 @ 23:06)  POCT Blood Glucose.: 250 mg/dL (01-19-24 @ 22:16)  POCT Blood Glucose.: 297 mg/dL (01-19-24 @ 19:47)  POCT Blood Glucose.: 305 mg/dL (01-19-24 @ 18:18)  POCT Blood Glucose.: 327 mg/dL (01-19-24 @ 17:05)  POCT Blood Glucose.: 403 mg/dL (01-19-24 @ 15:12)  POCT Blood Glucose.: 421 mg/dL (01-19-24 @ 14:35)  POCT Blood Glucose.: 432 mg/dL (01-19-24 @ 12:55)  POCT Blood Glucose.: 429 mg/dL (01-19-24 @ 12:17)  POCT Blood Glucose.: 419 mg/dL (01-19-24 @ 12:16)                          9.8    6.39  )-----------( 223      ( 21 Jan 2024 07:20 )             30.7     01-21    139  |  103  |  14  ----------------------------<  183<H>  3.4<L>   |  22  |  0.54    Ca    8.3<L>      21 Jan 2024 07:19  Phos  2.8     01-21  Mg     1.8     01-21    TPro  6.4  /  Alb  3.5  /  TBili  0.2  /  DBili  x   /  AST  16  /  ALT  14  /  AlkPhos  84  01-20    eGFR: 108 mL/min/1.73m2 (21 Jan 2024 07:19)  eGFR: 108 mL/min/1.73m2 (20 Jan 2024 18:44)      Thyroid Function Tests:  01-19 @ 17:44 TSH 1.10 FreeT4 0.9 T3 -- Anti TPO <10.0 Anti Thyroglobulin Ab -- TSI --      A1C with Estimated Average Glucose Result: 12.7 % (01-19-24 @ 17:44)    Estimated Average Glucose: 318 mg/dL (01-19-24 @ 17:44)        Diet, Regular:   Consistent Carbohydrate No Snacks (CSTCHO) (01-20-24 @ 14:58) [Active]

## 2024-01-21 NOTE — PROGRESS NOTE ADULT - PROBLEM SELECTOR PLAN 2
- Has history of elevated albumin/creatinine ratio in April 2023  - BPs WNL this admission w/ BP target <130/80  - Consider ACEi/ARB therapy on discharge (denies taking in years and reports having "white coat syndrome and not true HTN)

## 2024-01-21 NOTE — PROGRESS NOTE ADULT - PROBLEM SELECTOR PLAN 5
DVT PPx: Lovenox 40mg QD   Diet: NPO for now --> CC   Bowel Regimen: PRN miralax Qd   Code: Full    Dispo: Pending PT/OT & Hospital Course      --------------------------------------------------------------  Edilberto Langley MD  PGY-1, Internal Medicine  [employer] Teams preferred  -------------------------------------------------------------- DVT PPx: Lovenox 40mg QD (but pt declining at this time)  Diet: NPO for now --> CC PO diet as tolerated  Bowel Regimen: PRN Miralax Qd   Code: Full    Dispo: Pending hospital Course --> likely dc home w/o home needs as pt ambulatory and performs all ADLs at baseline      --------------------------------------------------------------  Edilberto Langley MD  PGY-1, Internal Medicine  [employer] Teams preferred  --------------------------------------------------------------

## 2024-01-21 NOTE — PROGRESS NOTE ADULT - ASSESSMENT
This is a 57 yo F /w a PMH of subclinical hypothyroidism type 1 diabetes, HTN, and HLD who presents with DKA, prompting endocrinology consult     Met with patient and reviewed the following:    -A1c LEVEL: Present and goal  -Blood glucose goals: 100s to 150s as out pt  -Glucose monitoring frequency: ac and hs  -Hypoglycemia prevention,detection and treatment  -Insulin(s) action, time of administration and side effects  -Importance of follow up care  - DKA precautions- ketone strips  - sick day insulin management- reduce basal insulin if not eating do not hold  Educated patient on current and potential complications of Diabetes (Retinopathy, neuropathy, nephropathy, MI, stroke, delayed wound healing )        #DM1, s/p DKA now resolved  - A1C 7.5 (april 2023)--->12.7 this admission, patient reported recent loss of her son last year & has not been strictly adhering to insulin/cho diet  While inpt:   BG Goal 100-180mg/dl   FS TID AC qhs  -c/w Lantus to 15 units qhs  -post prandial BG tightly controlled with poor po intake, reduce to  admelog 3 units with meals hold it npo hold if not eating   -low admelog correction scales before meals and bedtime  -carb consistent diet  -FSG before meals and bedtime with target glucose 100-180mg/dL  -low admelog correction scales before meals and bedtime  -hypoglycemia protocol in place      #discharge  -patient reported recent loss of her son last year & has not been strictly adhering to insulin/cho diet evidenced by large increase in a1c 7.5->>12.7 and now admitted for DKA   - patient lost her insurance and has been unable to follow up with Dr Blair since april  - While patient is without insurance she should f/u with endo clinic and medicine clinic- primary team aware to make appt with med clinic, emailing for endo clinic appt   -  consult for [facility] application for patient to receive free insulin   - patient reported she has glucometer/testing supplies/pen needles/ketone strips   -resume basal-bolus insulin, doses to be determined based on inpatient requirements  - [facility]  at [facility] on [address] phone :[phone]   -When insurance reinstated can follow up at Endocrine Practice at [address] [place] [address], [address], [place] [address]; Ph # [phone] with Dr. Blair    #HLD  -resume crestor 5mg daily on discharge    #HTN  -BP target <130/80  -has history of elevated albumin/creatinine ratio in April 2023  -would recommend starting ACEi/ARB therapy on discharge    #subclinical hypothyroidism   -last TSH 6.1,   -patients with DM1 at high risk for hypothyroidism due to Hashimoto's  -please check TSH, Free T4, and TPO antibodies        discussed with patient and primary team Elinor MCKEON, RN   Contact via [employer] Teams during business hours  To reach covering provider access AMION via [employer]  For Urgent matters/after-hours/weekends/holidays please page endocrine fellow on call   For nonurgent matters please email [email or web address]    Please note that this patient may be followed by different provider tomorrow.  Notify endocrine 24 hours prior to discharge for final recommendations

## 2024-01-21 NOTE — PROGRESS NOTE ADULT - PROBLEM SELECTOR PLAN 4
done -TSH/T4  WNL this admission  - F/u TPO antibodies -TSH/T4  WNL this admission  - Negative TPO antibodies <10

## 2024-01-21 NOTE — PROGRESS NOTE ADULT - PROBLEM SELECTOR PLAN 1
-Initial labs: pH 7.24, pCO2 39, pO2 29 on VBG; BHB >8, Bicarb 14, AG 29  -s/p 10u lantus, 10u admelog  -Labs following intervention: BHB 6.6, Bicarb 15, AG 23  --> AG now 15 and HCO3 22   -Hyperglycemia, ketosis improving w/ resumption of insulin regimen  - Repeat VBG --> PH 7.32 w/ PCO2 36, improving from prior   - C/w q4h FS BG to ensure improved hyperglycemia over next 12-24h  - C/w IVIS >q6h BMP, VBG to assess for hyperkalemia, acidosis, AG etc.  - Reglan trial 10mg IVP this AM (Zofran ineffective in ED); QTc this admission 447    #post-DKA DM1 plan endo recs as below & appreciated  - c/w lantus q24hr & start admelog 2 units with breakfast and 4 units with lunch and dinner  - low admelog correction scales before meals and bedtime  - FSG before meals and bedtime with target glucose 100-180mg/dL  - low admelog correction scales before meals and bedtime    Plan for dc as below per endo, recs appreciated  - Resume basal-bolus insulin, doses to be determined based on inpatient requirements  - Follow up at Endocrine Practice at [address] [place] [address], [address], [place] [address]; Ph # [phone] with Dr. Blair -Initial labs: pH 7.24, pCO2 39, pO2 29 on VBG; BHB >8, Bicarb 14, AG 29  -s/p 10u lantus, 10u admelog  -Labs following intervention: BHB 6.6, Bicarb 15, AG 23  --> AG now 15 and HCO3 22 and VBG x2 continues to improve approaching WNL   - C/w q4h FS BG to ensure improved hyperglycemia over next 24h   - C/w IVIS >q6h BMP, VBG to assess for hyperkalemia, acidosis, AG etc., replete lytes as needed  - Reglan trial 10mg Qd for nausea (failed zofran in ED); QTc this admission 447  --> Nausea improved and tolerating some PO intake s/p reglan    - Plan for transition to post DKA mgmt on 1/22 as below as pt is able to tolerate more PO intake if labs/nausea/vomiting continues to remain improved       #post-DKA DM1 plan endo recs as below & appreciated  - c/w lantus q24hr & start admelog 2 units with breakfast and 4 units with lunch and dinner  - low admelog correction scales before meals and bedtime  - FSG before meals and bedtime with target glucose 100-180mg/dL  - low admelog correction scales before meals and bedtime    Plan for dc as below per endo, recs appreciated  - Resume basal-bolus insulin, doses TBD based on inpatient requirements  - Follow up at Endocrine Practice at [address] [place] [address], [address], [place] [address]; Ph # [phone] with Dr. Blair

## 2024-01-21 NOTE — PROGRESS NOTE ADULT - ASSESSMENT
58y F hx of T1DM, subclinical hypothyroidism, HTN, HLD, who presented to the ED for cough, general malaise, and n/v x3-4 days w/ influenza+ contacts found to be in DKA on admission  i/s/o likely flu and missed insulin dosing. Admitted to medicine s/p IVF 2L bolus and on maintenance IVF with basal/bolus insulin.

## 2024-01-21 NOTE — PROGRESS NOTE ADULT - SUBJECTIVE AND OBJECTIVE BOX
PROGRESS NOTE:   Authored by Dr. Edilberto Langley MD (PGY-1)    Patient is a 58y old  Female who presents with a chief complaint of DKA (20 Jan 2024 18:26)      SUBJECTIVE / OVERNIGHT EVENTS:  No acute events overnight.       MEDICATIONS  (STANDING):  dextrose 5%. 1000 milliLiter(s) (100 mL/Hr) IV Continuous <Continuous>  dextrose 5%. 1000 milliLiter(s) (50 mL/Hr) IV Continuous <Continuous>  dextrose 50% Injectable 25 Gram(s) IV Push once  dextrose 50% Injectable 25 Gram(s) IV Push once  dextrose 50% Injectable 12.5 Gram(s) IV Push once  enoxaparin Injectable 40 milliGRAM(s) SubCutaneous every 24 hours  glucagon  Injectable 1 milliGRAM(s) IntraMuscular once  insulin glargine Injectable (LANTUS) 15 Unit(s) SubCutaneous at bedtime  insulin lispro (ADMELOG) corrective regimen sliding scale   SubCutaneous at bedtime  insulin lispro (ADMELOG) corrective regimen sliding scale   SubCutaneous three times a day before meals  insulin lispro Injectable (ADMELOG) 5 Unit(s) SubCutaneous three times a day before meals  lactated ringers. 1000 milliLiter(s) (100 mL/Hr) IV Continuous <Continuous>  magnesium sulfate  IVPB 2 Gram(s) IV Intermittent once  pantoprazole  Injectable 40 milliGRAM(s) IV Push daily    MEDICATIONS  (PRN):  aluminum hydroxide/magnesium hydroxide/simethicone Suspension 30 milliLiter(s) Oral every 6 hours PRN Dyspepsia  dextrose Oral Gel 15 Gram(s) Oral once PRN Blood Glucose LESS THAN 70 milliGRAM(s)/deciliter      CAPILLARY BLOOD GLUCOSE      POCT Blood Glucose.: 200 mg/dL (20 Jan 2024 21:14)  POCT Blood Glucose.: 245 mg/dL (20 Jan 2024 17:36)  POCT Blood Glucose.: 284 mg/dL (20 Jan 2024 14:12)  POCT Blood Glucose.: 296 mg/dL (20 Jan 2024 12:02)  POCT Blood Glucose.: 308 mg/dL (20 Jan 2024 08:26)    I&O's Summary      PHYSICAL EXAM:  Vital Signs Last 24 Hrs  T(C): 37.1 (21 Jan 2024 04:55), Max: 37.1 (21 Jan 2024 04:55)  T(F): 98.7 (21 Jan 2024 04:55), Max: 98.7 (21 Jan 2024 04:55)  HR: 89 (21 Jan 2024 04:55) (80 - 89)  BP: 162/66 (21 Jan 2024 04:55) (150/69 - 167/72)  BP(mean): --  RR: 18 (21 Jan 2024 04:55) (18 - 18)  SpO2: 97% (21 Jan 2024 04:55) (95% - 99%)    Parameters below as of 21 Jan 2024 04:55  Patient On (Oxygen Delivery Method): room air        GENERAL: NAD. Thin body habitus.  HEAD:  Atraumatic, Normocephalic  EYES: EOM grossly intact, conjunctiva and sclera clear  ENT: Moist mucous membranes  NECK: Supple  CHEST/LUNG: Unlabored respirations. wet cough; b/l coarse crackles.  HEART: Regular rate and rhythm. No murmurs, rubs, or gallops  ABDOMEN: Soft, nondistended, nontender  EXTREMITIES:  No cyanosis or edema. Brisk capillary refill  NERVOUS SYSTEM:  No focal deficits. A&Ox3  SKIN: No rashes or lesions appreciated.    LABS:                        9.9    6.47  )-----------( 203      ( 20 Jan 2024 07:28 )             30.7     01-20    143  |  106  |  18  ----------------------------<  214<H>  4.4   |  22  |  0.55    Ca    8.5      20 Jan 2024 18:44  Phos  1.1     01-20  Mg     1.8     01-20    TPro  6.4  /  Alb  3.5  /  TBili  0.2  /  DBili  x   /  AST  16  /  ALT  14  /  AlkPhos  84  01-20          Urinalysis Basic - ( 20 Jan 2024 18:44 )    Color: x / Appearance: x / SG: x / pH: x  Gluc: 214 mg/dL / Ketone: x  / Bili: x / Urobili: x   Blood: x / Protein: x / Nitrite: x   Leuk Esterase: x / RBC: x / WBC x   Sq Epi: x / Non Sq Epi: x / Bacteria: x          Tele Reviewed:    RADIOLOGY & ADDITIONAL TESTS:  Results Reviewed:   Imaging Personally Reviewed:  Electrocardiogram Personally Reviewed:     PROGRESS NOTE:   Authored by Dr. Edilberto Langley MD (PGY-1)    Patient is a 58y old  Female who presents with a chief complaint of DKA (20 Jan 2024 18:26)      SUBJECTIVE / OVERNIGHT EVENTS:  No acute events overnight. Pt nausea resolved and is tolerating some PO intake. Pt does report lingering mild cough, but improved and denies sputum production. Denies further vomiting. Denies CP/SOB. Denies diarrhea/contipation. Denies HA/blurry vision/confusion or other symptoms/concerns at this time.       MEDICATIONS  (STANDING):  dextrose 5%. 1000 milliLiter(s) (100 mL/Hr) IV Continuous <Continuous>  dextrose 5%. 1000 milliLiter(s) (50 mL/Hr) IV Continuous <Continuous>  dextrose 50% Injectable 25 Gram(s) IV Push once  dextrose 50% Injectable 25 Gram(s) IV Push once  dextrose 50% Injectable 12.5 Gram(s) IV Push once  enoxaparin Injectable 40 milliGRAM(s) SubCutaneous every 24 hours  glucagon  Injectable 1 milliGRAM(s) IntraMuscular once  insulin glargine Injectable (LANTUS) 15 Unit(s) SubCutaneous at bedtime  insulin lispro (ADMELOG) corrective regimen sliding scale   SubCutaneous at bedtime  insulin lispro (ADMELOG) corrective regimen sliding scale   SubCutaneous three times a day before meals  insulin lispro Injectable (ADMELOG) 5 Unit(s) SubCutaneous three times a day before meals  lactated ringers. 1000 milliLiter(s) (100 mL/Hr) IV Continuous <Continuous>  magnesium sulfate  IVPB 2 Gram(s) IV Intermittent once  pantoprazole  Injectable 40 milliGRAM(s) IV Push daily    MEDICATIONS  (PRN):  aluminum hydroxide/magnesium hydroxide/simethicone Suspension 30 milliLiter(s) Oral every 6 hours PRN Dyspepsia  dextrose Oral Gel 15 Gram(s) Oral once PRN Blood Glucose LESS THAN 70 milliGRAM(s)/deciliter      CAPILLARY BLOOD GLUCOSE      POCT Blood Glucose.: 200 mg/dL (20 Jan 2024 21:14)  POCT Blood Glucose.: 245 mg/dL (20 Jan 2024 17:36)  POCT Blood Glucose.: 284 mg/dL (20 Jan 2024 14:12)  POCT Blood Glucose.: 296 mg/dL (20 Jan 2024 12:02)  POCT Blood Glucose.: 308 mg/dL (20 Jan 2024 08:26)    I&O's Summary      PHYSICAL EXAM:  Vital Signs Last 24 Hrs  T(C): 37.1 (21 Jan 2024 04:55), Max: 37.1 (21 Jan 2024 04:55)  T(F): 98.7 (21 Jan 2024 04:55), Max: 98.7 (21 Jan 2024 04:55)  HR: 89 (21 Jan 2024 04:55) (80 - 89)  BP: 162/66 (21 Jan 2024 04:55) (150/69 - 167/72)  BP(mean): --  RR: 18 (21 Jan 2024 04:55) (18 - 18)  SpO2: 97% (21 Jan 2024 04:55) (95% - 99%)    Parameters below as of 21 Jan 2024 04:55  Patient On (Oxygen Delivery Method): room air        GENERAL: Pt sitting upright in bed appearing in NAD  HEAD:  Atraumatic, Normocephalic  EYES: EOM grossly intact   ENT: Moist mucous membranes  NECK: Supple  CHEST/LUNG: Unlabored respirations. + cough during auscultation, sounding wet; Lungs minimal rhonchi appreciated diffusely in bl lung fields, improved from prior.  HEART: Regular rate and rhythm. No murmurs, rubs, or gallops  ABDOMEN: Soft, nondistended, nontender  EXTREMITIES:  No cyanosis or edema. Brisk capillary refill  NERVOUS SYSTEM:  No focal deficits. A&Ox3  SKIN: No rashes or lesions appreciated.    LABS:                        9.9    6.47  )-----------( 203      ( 20 Jan 2024 07:28 )             30.7     01-20    143  |  106  |  18  ----------------------------<  214<H>  4.4   |  22  |  0.55    Ca    8.5      20 Jan 2024 18:44  Phos  1.1     01-20  Mg     1.8     01-20    TPro  6.4  /  Alb  3.5  /  TBili  0.2  /  DBili  x   /  AST  16  /  ALT  14  /  AlkPhos  84  01-20          Urinalysis Basic - ( 20 Jan 2024 18:44 )    Color: x / Appearance: x / SG: x / pH: x  Gluc: 214 mg/dL / Ketone: x  / Bili: x / Urobili: x   Blood: x / Protein: x / Nitrite: x   Leuk Esterase: x / RBC: x / WBC x   Sq Epi: x / Non Sq Epi: x / Bacteria: x          Tele Reviewed:    RADIOLOGY & ADDITIONAL TESTS:  Results Reviewed:   Imaging Personally Reviewed:  Electrocardiogram Personally Reviewed:

## 2024-01-22 LAB
ALBUMIN SERPL ELPH-MCNC: 2.2 G/DL — LOW (ref 3.3–5)
ALBUMIN SERPL ELPH-MCNC: 3.2 G/DL — LOW (ref 3.3–5)
ALP SERPL-CCNC: 57 U/L — SIGNIFICANT CHANGE UP (ref 40–120)
ALP SERPL-CCNC: 81 U/L — SIGNIFICANT CHANGE UP (ref 40–120)
ALT FLD-CCNC: 11 U/L — SIGNIFICANT CHANGE UP (ref 10–45)
ALT FLD-CCNC: 15 U/L — SIGNIFICANT CHANGE UP (ref 10–45)
ANION GAP SERPL CALC-SCNC: 14 MMOL/L — SIGNIFICANT CHANGE UP (ref 5–17)
ANION GAP SERPL CALC-SCNC: 9 MMOL/L — SIGNIFICANT CHANGE UP (ref 5–17)
AST SERPL-CCNC: 16 U/L — SIGNIFICANT CHANGE UP (ref 10–40)
AST SERPL-CCNC: 22 U/L — SIGNIFICANT CHANGE UP (ref 10–40)
BASOPHILS # BLD AUTO: 0 K/UL — SIGNIFICANT CHANGE UP (ref 0–0.2)
BASOPHILS NFR BLD AUTO: 0 % — SIGNIFICANT CHANGE UP (ref 0–2)
BILIRUB SERPL-MCNC: 0.3 MG/DL — SIGNIFICANT CHANGE UP (ref 0.2–1.2)
BILIRUB SERPL-MCNC: 0.4 MG/DL — SIGNIFICANT CHANGE UP (ref 0.2–1.2)
BLD GP AB SCN SERPL QL: NEGATIVE — SIGNIFICANT CHANGE UP
BUN SERPL-MCNC: 7 MG/DL — SIGNIFICANT CHANGE UP (ref 7–23)
BUN SERPL-MCNC: 9 MG/DL — SIGNIFICANT CHANGE UP (ref 7–23)
CALCIUM SERPL-MCNC: 7.5 MG/DL — LOW (ref 8.4–10.5)
CALCIUM SERPL-MCNC: 8.4 MG/DL — SIGNIFICANT CHANGE UP (ref 8.4–10.5)
CHLORIDE SERPL-SCNC: 104 MMOL/L — SIGNIFICANT CHANGE UP (ref 96–108)
CHLORIDE SERPL-SCNC: 106 MMOL/L — SIGNIFICANT CHANGE UP (ref 96–108)
CO2 SERPL-SCNC: 20 MMOL/L — LOW (ref 22–31)
CO2 SERPL-SCNC: 29 MMOL/L — SIGNIFICANT CHANGE UP (ref 22–31)
CREAT SERPL-MCNC: 0.42 MG/DL — LOW (ref 0.5–1.3)
CREAT SERPL-MCNC: 0.62 MG/DL — SIGNIFICANT CHANGE UP (ref 0.5–1.3)
EGFR: 104 ML/MIN/1.73M2 — SIGNIFICANT CHANGE UP
EGFR: 115 ML/MIN/1.73M2 — SIGNIFICANT CHANGE UP
EOSINOPHIL # BLD AUTO: 0 K/UL — SIGNIFICANT CHANGE UP (ref 0–0.5)
EOSINOPHIL NFR BLD AUTO: 0 % — SIGNIFICANT CHANGE UP (ref 0–6)
GLUCOSE BLDC GLUCOMTR-MCNC: 116 MG/DL — HIGH (ref 70–99)
GLUCOSE BLDC GLUCOMTR-MCNC: 209 MG/DL — HIGH (ref 70–99)
GLUCOSE BLDC GLUCOMTR-MCNC: 56 MG/DL — LOW (ref 70–99)
GLUCOSE BLDC GLUCOMTR-MCNC: 80 MG/DL — SIGNIFICANT CHANGE UP (ref 70–99)
GLUCOSE BLDC GLUCOMTR-MCNC: 93 MG/DL — SIGNIFICANT CHANGE UP (ref 70–99)
GLUCOSE BLDC GLUCOMTR-MCNC: 97 MG/DL — SIGNIFICANT CHANGE UP (ref 70–99)
GLUCOSE SERPL-MCNC: 36 MG/DL — CRITICAL LOW (ref 70–99)
GLUCOSE SERPL-MCNC: 95 MG/DL — SIGNIFICANT CHANGE UP (ref 70–99)
HCT VFR BLD CALC: 23 % — LOW (ref 34.5–45)
HCT VFR BLD CALC: 33.9 % — LOW (ref 34.5–45)
HGB BLD-MCNC: 10.9 G/DL — LOW (ref 11.5–15.5)
HGB BLD-MCNC: 7.3 G/DL — LOW (ref 11.5–15.5)
LYMPHOCYTES # BLD AUTO: 0.73 K/UL — LOW (ref 1–3.3)
LYMPHOCYTES # BLD AUTO: 12.2 % — LOW (ref 13–44)
MAGNESIUM SERPL-MCNC: 1.6 MG/DL — SIGNIFICANT CHANGE UP (ref 1.6–2.6)
MAGNESIUM SERPL-MCNC: 2 MG/DL — SIGNIFICANT CHANGE UP (ref 1.6–2.6)
MANUAL SMEAR VERIFICATION: SIGNIFICANT CHANGE UP
MCHC RBC-ENTMCNC: 29.1 PG — SIGNIFICANT CHANGE UP (ref 27–34)
MCHC RBC-ENTMCNC: 29.6 PG — SIGNIFICANT CHANGE UP (ref 27–34)
MCHC RBC-ENTMCNC: 31.7 GM/DL — LOW (ref 32–36)
MCHC RBC-ENTMCNC: 32.2 GM/DL — SIGNIFICANT CHANGE UP (ref 32–36)
MCV RBC AUTO: 90.4 FL — SIGNIFICANT CHANGE UP (ref 80–100)
MCV RBC AUTO: 93.1 FL — SIGNIFICANT CHANGE UP (ref 80–100)
MONOCYTES # BLD AUTO: 0.41 K/UL — SIGNIFICANT CHANGE UP (ref 0–0.9)
MONOCYTES NFR BLD AUTO: 6.9 % — SIGNIFICANT CHANGE UP (ref 2–14)
NEUTROPHILS # BLD AUTO: 4.78 K/UL — SIGNIFICANT CHANGE UP (ref 1.8–7.4)
NEUTROPHILS NFR BLD AUTO: 73.9 % — SIGNIFICANT CHANGE UP (ref 43–77)
NEUTS BAND # BLD: 6.1 % — SIGNIFICANT CHANGE UP (ref 0–8)
NRBC # BLD: 0 /100 WBCS — SIGNIFICANT CHANGE UP (ref 0–0)
PHOSPHATE SERPL-MCNC: 0.8 MG/DL — CRITICAL LOW (ref 2.5–4.5)
PHOSPHATE SERPL-MCNC: 2.7 MG/DL — SIGNIFICANT CHANGE UP (ref 2.5–4.5)
PLAT MORPH BLD: NORMAL — SIGNIFICANT CHANGE UP
PLATELET # BLD AUTO: 157 K/UL — SIGNIFICANT CHANGE UP (ref 150–400)
PLATELET # BLD AUTO: 257 K/UL — SIGNIFICANT CHANGE UP (ref 150–400)
POTASSIUM SERPL-MCNC: 3.9 MMOL/L — SIGNIFICANT CHANGE UP (ref 3.5–5.3)
POTASSIUM SERPL-MCNC: 4.9 MMOL/L — SIGNIFICANT CHANGE UP (ref 3.5–5.3)
POTASSIUM SERPL-SCNC: 3.9 MMOL/L — SIGNIFICANT CHANGE UP (ref 3.5–5.3)
POTASSIUM SERPL-SCNC: 4.9 MMOL/L — SIGNIFICANT CHANGE UP (ref 3.5–5.3)
PROT SERPL-MCNC: 4.5 G/DL — LOW (ref 6–8.3)
PROT SERPL-MCNC: 6.5 G/DL — SIGNIFICANT CHANGE UP (ref 6–8.3)
RBC # BLD: 2.47 M/UL — LOW (ref 3.8–5.2)
RBC # BLD: 3.75 M/UL — LOW (ref 3.8–5.2)
RBC # FLD: 12.7 % — SIGNIFICANT CHANGE UP (ref 10.3–14.5)
RBC # FLD: 12.8 % — SIGNIFICANT CHANGE UP (ref 10.3–14.5)
RBC BLD AUTO: SIGNIFICANT CHANGE UP
RH IG SCN BLD-IMP: POSITIVE — SIGNIFICANT CHANGE UP
SODIUM SERPL-SCNC: 138 MMOL/L — SIGNIFICANT CHANGE UP (ref 135–145)
SODIUM SERPL-SCNC: 144 MMOL/L — SIGNIFICANT CHANGE UP (ref 135–145)
T4 FREE SERPL-MCNC: 1.4 NG/DL — SIGNIFICANT CHANGE UP (ref 0.9–1.8)
THYROPEROXIDASE AB SERPL-ACNC: <10 IU/ML — SIGNIFICANT CHANGE UP
TSH SERPL-MCNC: 1.25 UIU/ML — SIGNIFICANT CHANGE UP (ref 0.27–4.2)
VARIANT LYMPHS # BLD: 0.9 % — SIGNIFICANT CHANGE UP (ref 0–6)
WBC # BLD: 5.97 K/UL — SIGNIFICANT CHANGE UP (ref 3.8–10.5)
WBC # BLD: 9.45 K/UL — SIGNIFICANT CHANGE UP (ref 3.8–10.5)
WBC # FLD AUTO: 5.97 K/UL — SIGNIFICANT CHANGE UP (ref 3.8–10.5)
WBC # FLD AUTO: 9.45 K/UL — SIGNIFICANT CHANGE UP (ref 3.8–10.5)

## 2024-01-22 PROCEDURE — 99233 SBSQ HOSP IP/OBS HIGH 50: CPT | Mod: GC

## 2024-01-22 PROCEDURE — 99232 SBSQ HOSP IP/OBS MODERATE 35: CPT

## 2024-01-22 RX ORDER — PANTOPRAZOLE SODIUM 20 MG/1
40 TABLET, DELAYED RELEASE ORAL DAILY
Refills: 0 | Status: DISCONTINUED | OUTPATIENT
Start: 2024-01-23 | End: 2024-01-24

## 2024-01-22 RX ORDER — SODIUM CHLORIDE 9 MG/ML
4 INJECTION INTRAMUSCULAR; INTRAVENOUS; SUBCUTANEOUS EVERY 12 HOURS
Refills: 0 | Status: DISCONTINUED | OUTPATIENT
Start: 2024-01-22 | End: 2024-01-23

## 2024-01-22 RX ORDER — POTASSIUM PHOSPHATE, MONOBASIC POTASSIUM PHOSPHATE, DIBASIC 236; 224 MG/ML; MG/ML
30 INJECTION, SOLUTION INTRAVENOUS ONCE
Refills: 0 | Status: COMPLETED | OUTPATIENT
Start: 2024-01-22 | End: 2024-01-22

## 2024-01-22 RX ORDER — INSULIN LISPRO 100/ML
VIAL (ML) SUBCUTANEOUS
Refills: 0 | Status: DISCONTINUED | OUTPATIENT
Start: 2024-01-22 | End: 2024-01-24

## 2024-01-22 RX ORDER — DEXTROSE 50 % IN WATER 50 %
50 SYRINGE (ML) INTRAVENOUS ONCE
Refills: 0 | Status: COMPLETED | OUTPATIENT
Start: 2024-01-22 | End: 2024-01-22

## 2024-01-22 RX ORDER — INSULIN GLARGINE 100 [IU]/ML
8 INJECTION, SOLUTION SUBCUTANEOUS AT BEDTIME
Refills: 0 | Status: DISCONTINUED | OUTPATIENT
Start: 2024-01-22 | End: 2024-01-23

## 2024-01-22 RX ADMIN — PANTOPRAZOLE SODIUM 40 MILLIGRAM(S): 20 TABLET, DELAYED RELEASE ORAL at 11:52

## 2024-01-22 RX ADMIN — POTASSIUM PHOSPHATE, MONOBASIC POTASSIUM PHOSPHATE, DIBASIC 83.33 MILLIMOLE(S): 236; 224 INJECTION, SOLUTION INTRAVENOUS at 10:01

## 2024-01-22 RX ADMIN — Medication 100 MILLIGRAM(S): at 05:44

## 2024-01-22 RX ADMIN — Medication 600 MILLIGRAM(S): at 22:41

## 2024-01-22 RX ADMIN — Medication 3 UNIT(S): at 08:49

## 2024-01-22 RX ADMIN — Medication 50 MILLILITER(S): at 08:22

## 2024-01-22 RX ADMIN — INSULIN GLARGINE 8 UNIT(S): 100 INJECTION, SOLUTION SUBCUTANEOUS at 21:53

## 2024-01-22 RX ADMIN — Medication 100 MILLIGRAM(S): at 13:44

## 2024-01-22 RX ADMIN — Medication 100 MILLIGRAM(S): at 21:53

## 2024-01-22 NOTE — PROGRESS NOTE ADULT - PROBLEM SELECTOR PLAN 1
-Initial labs: pH 7.24, pCO2 39, pO2 29 on VBG; BHB >8, Bicarb 14, AG 29  -s/p 10u lantus, 10u admelog  -Labs following intervention: BHB 6.6, Bicarb 15, AG 23  --> AG now 15 and HCO3 22 and VBG x2 continues to improve approaching WNL   - C/w q4h FS BG to ensure improved hyperglycemia over next 24h   - C/w IVIS >q6h BMP, VBG to assess for hyperkalemia, acidosis, AG etc., replete lytes as needed  - Reglan trial 10mg Qd for nausea (failed zofran in ED); QTc this admission 447  --> Nausea improved and tolerating some PO intake s/p reglan    - Plan for transition to post DKA mgmt on 1/22 as below as pt is able to tolerate more PO intake if labs/nausea/vomiting continues to remain improved       #post-DKA DM1 plan endo recs as below & appreciated  - c/w lantus q24hr & start admelog 2 units with breakfast and 4 units with lunch and dinner  - low admelog correction scales before meals and bedtime  - FSG before meals and bedtime with target glucose 100-180mg/dL  - low admelog correction scales before meals and bedtime    Plan for dc as below per endo, recs appreciated  - Resume basal-bolus insulin, doses TBD based on inpatient requirements  - Follow up at Endocrine Practice at [address] [place] [address], [address], [place] [address]; Ph # [phone] with Dr. Blair resolved   Plan  -Endocrine on board-appreciate further recommendations  -On Basal/bolus- will adjust as needed   -Encouraging PO Intake

## 2024-01-22 NOTE — PROVIDER CONTACT NOTE (HYPOGLYCEMIA EVENT) - NS PROVIDER CONTACT BACKGROUND-HYPO
Age: 58y    Gender: Female    POCT Blood Glucose:  209 mg/dL (01-22-24 @ 08:41)  56 mg/dL (01-22-24 @ 08:09)  119 mg/dL (01-21-24 @ 21:23)  81 mg/dL (01-21-24 @ 16:51)  84 mg/dL (01-21-24 @ 12:06)      eMAR:  dextrose 50% Injectable   50 milliLiter(s) IV Push (01-22-24 @ 08:22)    insulin glargine Injectable (LANTUS)   12 Unit(s) SubCutaneous (01-21-24 @ 22:22)    insulin lispro Injectable (ADMELOG)   3 Unit(s) SubCutaneous (01-22-24 @ 08:49)

## 2024-01-22 NOTE — PROGRESS NOTE ADULT - PROBLEM SELECTOR PLAN 5
DVT PPx: Lovenox 40mg QD (but pt declining at this time)  Diet: NPO for now --> CC PO diet as tolerated  Bowel Regimen: PRN Miralax Qd   Code: Full    Dispo: Pending hospital Course --> likely dc home w/o home needs as pt ambulatory and performs all ADLs at baseline      --------------------------------------------------------------  Edilberto Langley MD  PGY-1, Internal Medicine  [employer] Teams preferred  -------------------------------------------------------------- DVT PPx: Lovenox 40mg QD (but pt declining at this time)  Diet: NPO for now --> CC PO diet as tolerated  Bowel Regimen: PRN Miralax Qd   Code: Full    Dispo: Pending hospital Course --> likely dc home w/o home needs as pt ambulatory and performs all ADLs at baseline

## 2024-01-22 NOTE — PROGRESS NOTE ADULT - PROBLEM SELECTOR PLAN 2
-BP target <130/80  -has history of elevated albumin/creatinine ratio in April 2023  -would recommend starting ACEi/ARB therapy on discharge

## 2024-01-22 NOTE — PROGRESS NOTE ADULT - ASSESSMENT
57 yo F /w/h/o uncontrolled T1DM (A1C 12.7%) on Basaglar/Admelog. Pt didn't take insulin since she got sick. DM c/b retinopathy, nephropathy, neuropathy. Also h/o subclinical hypothyroidism, HTN, and HLD. Here with DKA, prompting endocrinology consult. Pt with poor PO intake> starting to eat better today. Severe fasting hypoglycemia without symptoms. Will continue to adjust insulin to keep BG goal 100 to 180s without further hypoglycemia.     Met with patient and reviewed the following:  -Sick day care. Including the need for basal insulin even if not eating  -A1c LEVEL: Present and goal. Patient reported recent loss of her son last year & has not been strictly adhering to insulin/cho diet  -Blood glucose goals: 100s to 160s as out pt  -Glucose monitoring frequency: ac and hs. Formerly on Dexcom but pt no longer has insurance and can't afford it  -Hypoglycemia prevention, detection and treatment  -Insulin(s) action, time of administration and side effects. Need for basal/bolus therapy  -Importance of follow up care. Can follow at endo clinic  Pt able to verbalize understanding regarding the need for glucose monitoring, diet, DM meds and follow up care.

## 2024-01-22 NOTE — PROGRESS NOTE ADULT - PROBLEM SELECTOR PLAN 3
LDL goal <70 due to DM  Pt LDL NA  Order fasting lipid if not recently done  Resume crestor 5mg daily on discharge  Manage per primary team   F/u levels as out pt

## 2024-01-22 NOTE — PROVIDER CONTACT NOTE (CRITICAL VALUE NOTIFICATION) - ACTION/TREATMENT ORDERED:
Fingerstick performed and glucose was 284. The patient was getting dextrose through the same arm that the blood was taken from. Medical team believes that's why the lab was elevated.
ACP Paulo Odom made aware. Finger stick 56 and critical glucose 36. Patient drank 4 ounces of apple juice. Dextrose 50% injectable administered as per orders. Repeat finger stick was 209. Pt encouraged to eat full meals. IV Potasium phosphate ordered. Will continue to monitor.

## 2024-01-22 NOTE — PROVIDER CONTACT NOTE (CRITICAL VALUE NOTIFICATION) - ASSESSMENT
Pt AOx4. VSS on 3L NC. Pt verbalized shakiness and haziness. No signs of chest pain or shortness of breath. Pt verbalized decrease PO intake.
Pt is A & Ox4. On room air. Vital Signs Stable .

## 2024-01-22 NOTE — PROGRESS NOTE ADULT - SUBJECTIVE AND OBJECTIVE BOX
PROGRESS NOTE:   Authored by Paulo Odom MD  Patient is a 58y old  Female who presents with a chief complaint of DKA (21 Jan 2024 13:10)      SUBJECTIVE / OVERNIGHT EVENTS:  No acute events overnight.     ADDITIONAL REVIEW OF SYSTEMS:    MEDICATIONS  (STANDING):  benzonatate 100 milliGRAM(s) Oral three times a day  dextrose 5%. 1000 milliLiter(s) (100 mL/Hr) IV Continuous <Continuous>  dextrose 5%. 1000 milliLiter(s) (50 mL/Hr) IV Continuous <Continuous>  dextrose 50% Injectable 25 Gram(s) IV Push once  dextrose 50% Injectable 25 Gram(s) IV Push once  dextrose 50% Injectable 12.5 Gram(s) IV Push once  enoxaparin Injectable 40 milliGRAM(s) SubCutaneous every 24 hours  glucagon  Injectable 1 milliGRAM(s) IntraMuscular once  insulin glargine Injectable (LANTUS) 12 Unit(s) SubCutaneous at bedtime  insulin lispro (ADMELOG) corrective regimen sliding scale   SubCutaneous three times a day before meals  insulin lispro (ADMELOG) corrective regimen sliding scale   SubCutaneous at bedtime  insulin lispro Injectable (ADMELOG) 3 Unit(s) SubCutaneous three times a day before meals  lactated ringers. 1000 milliLiter(s) (100 mL/Hr) IV Continuous <Continuous>  pantoprazole  Injectable 40 milliGRAM(s) IV Push daily    MEDICATIONS  (PRN):  aluminum hydroxide/magnesium hydroxide/simethicone Suspension 30 milliLiter(s) Oral every 6 hours PRN Dyspepsia  dextrose Oral Gel 15 Gram(s) Oral once PRN Blood Glucose LESS THAN 70 milliGRAM(s)/deciliter      CAPILLARY BLOOD GLUCOSE      POCT Blood Glucose.: 119 mg/dL (21 Jan 2024 21:23)  POCT Blood Glucose.: 81 mg/dL (21 Jan 2024 16:51)  POCT Blood Glucose.: 84 mg/dL (21 Jan 2024 12:06)  POCT Blood Glucose.: 146 mg/dL (21 Jan 2024 08:00)    I&O's Summary    21 Jan 2024 07:01  -  22 Jan 2024 07:00  --------------------------------------------------------  IN: 360 mL / OUT: 0 mL / NET: 360 mL        PHYSICAL EXAM:  Vital Signs Last 24 Hrs  T(C): 36.8 (22 Jan 2024 04:34), Max: 37 (21 Jan 2024 10:44)  T(F): 98.2 (22 Jan 2024 04:34), Max: 98.6 (21 Jan 2024 10:44)  HR: 87 (22 Jan 2024 04:34) (84 - 102)  BP: 149/67 (22 Jan 2024 04:34) (149/67 - 151/73)  BP(mean): --  RR: 18 (22 Jan 2024 04:34) (18 - 19)  SpO2: 97% (22 Jan 2024 04:34) (87% - 98%)    Parameters below as of 22 Jan 2024 04:34  Patient On (Oxygen Delivery Method): nasal cannula  O2 Flow (L/min): 3      GENERAL: No apparent distress.   HEAD:  Atraumatic, Normocephalic  EYES: EOMI, PERRLA, conjunctiva and sclera clear  NECK: Supple, no lymphadenopathy, no elevated JVP  CHEST/LUNG: Clear to auscultation bilateral and symmetric; No wheezes, rales, or rhonchi  HEART: S1 and S2 normal. Regular rate and rhythm; No murmurs, rubs, or gallops  ABDOMEN: Soft, non-tender, non-distended; normal bowel sounds  EXTREMITIES:  2+ peripheral pulses b/l, No clubbing, cyanosis, or edema  NEUROLOGY: A&O x 3, no focal deficits  SKIN: No rashes or lesions    LABS:                        9.8    6.39  )-----------( 223      ( 21 Jan 2024 07:20 )             30.7     01-21    139  |  103  |  14  ----------------------------<  183<H>  3.4<L>   |  22  |  0.54    Ca    8.3<L>      21 Jan 2024 07:19  Phos  2.8     01-21  Mg     1.8     01-21    TPro  6.4  /  Alb  3.5  /  TBili  0.2  /  DBili  x   /  AST  16  /  ALT  14  /  AlkPhos  84  01-20          Urinalysis Basic - ( 21 Jan 2024 07:19 )    Color: x / Appearance: x / SG: x / pH: x  Gluc: 183 mg/dL / Ketone: x  / Bili: x / Urobili: x   Blood: x / Protein: x / Nitrite: x   Leuk Esterase: x / RBC: x / WBC x   Sq Epi: x / Non Sq Epi: x / Bacteria: x          RADIOLOGY & ADDITIONAL TESTS:  Lab Results Reviewed   Imaging Reviewed  Electrocardiogram Reviewed   PROGRESS NOTE:   Authored by Paulo Odom MD  Patient is a 58y old  Female who presents with a chief complaint of DKA (21 Jan 2024 13:10)      SUBJECTIVE / OVERNIGHT EVENTS:  No acute events overnight. Patient was seen and examined at bedside and did not appear to be in any acute distress. Had an episode of hypoglycemia this morning that improved with juice and D50. She reports that her appetite is diminished, and she is only able to tolerate small amounts of food. Has bouts of cough that cause him dyspnea.       ADDITIONAL REVIEW OF SYSTEMS: All other systems negative unless otherwise specified.     MEDICATIONS  (STANDING):  benzonatate 100 milliGRAM(s) Oral three times a day  dextrose 5%. 1000 milliLiter(s) (100 mL/Hr) IV Continuous <Continuous>  dextrose 5%. 1000 milliLiter(s) (50 mL/Hr) IV Continuous <Continuous>  dextrose 50% Injectable 25 Gram(s) IV Push once  dextrose 50% Injectable 25 Gram(s) IV Push once  dextrose 50% Injectable 12.5 Gram(s) IV Push once  enoxaparin Injectable 40 milliGRAM(s) SubCutaneous every 24 hours  glucagon  Injectable 1 milliGRAM(s) IntraMuscular once  insulin glargine Injectable (LANTUS) 12 Unit(s) SubCutaneous at bedtime  insulin lispro (ADMELOG) corrective regimen sliding scale   SubCutaneous three times a day before meals  insulin lispro (ADMELOG) corrective regimen sliding scale   SubCutaneous at bedtime  insulin lispro Injectable (ADMELOG) 3 Unit(s) SubCutaneous three times a day before meals  lactated ringers. 1000 milliLiter(s) (100 mL/Hr) IV Continuous <Continuous>  pantoprazole  Injectable 40 milliGRAM(s) IV Push daily    MEDICATIONS  (PRN):  aluminum hydroxide/magnesium hydroxide/simethicone Suspension 30 milliLiter(s) Oral every 6 hours PRN Dyspepsia  dextrose Oral Gel 15 Gram(s) Oral once PRN Blood Glucose LESS THAN 70 milliGRAM(s)/deciliter      CAPILLARY BLOOD GLUCOSE      POCT Blood Glucose.: 119 mg/dL (21 Jan 2024 21:23)  POCT Blood Glucose.: 81 mg/dL (21 Jan 2024 16:51)  POCT Blood Glucose.: 84 mg/dL (21 Jan 2024 12:06)  POCT Blood Glucose.: 146 mg/dL (21 Jan 2024 08:00)    I&O's Summary    21 Jan 2024 07:01  -  22 Jan 2024 07:00  --------------------------------------------------------  IN: 360 mL / OUT: 0 mL / NET: 360 mL        PHYSICAL EXAM:  Vital Signs Last 24 Hrs  T(C): 36.8 (22 Jan 2024 04:34), Max: 37 (21 Jan 2024 10:44)  T(F): 98.2 (22 Jan 2024 04:34), Max: 98.6 (21 Jan 2024 10:44)  HR: 87 (22 Jan 2024 04:34) (84 - 102)  BP: 149/67 (22 Jan 2024 04:34) (149/67 - 151/73)  BP(mean): --  RR: 18 (22 Jan 2024 04:34) (18 - 19)  SpO2: 97% (22 Jan 2024 04:34) (87% - 98%)    Parameters below as of 22 Jan 2024 04:34  Patient On (Oxygen Delivery Method): nasal cannula  O2 Flow (L/min): 3      GENERAL: No apparent distress.   HEAD:  Atraumatic, Normocephalic  EYES: EOMI, PERRLA, conjunctiva and sclera clear  NECK: Supple, no lymphadenopathy, no elevated JVP  CHEST/LUNG: Clear to auscultation bilateral and symmetric; No wheezes, rales, or rhonchi  HEART: S1 and S2 normal. Regular rate and rhythm; No murmurs, rubs, or gallops  ABDOMEN: Soft, non-tender, non-distended; normal bowel sounds  EXTREMITIES:  2+ peripheral pulses b/l, No clubbing, cyanosis, or edema  NEUROLOGY: A&O x 3, no focal deficits  SKIN: No rashes or lesions    LABS:                        9.8    6.39  )-----------( 223      ( 21 Jan 2024 07:20 )             30.7     01-21    139  |  103  |  14  ----------------------------<  183<H>  3.4<L>   |  22  |  0.54    Ca    8.3<L>      21 Jan 2024 07:19  Phos  2.8     01-21  Mg     1.8     01-21    TPro  6.4  /  Alb  3.5  /  TBili  0.2  /  DBili  x   /  AST  16  /  ALT  14  /  AlkPhos  84  01-20          Urinalysis Basic - ( 21 Jan 2024 07:19 )    Color: x / Appearance: x / SG: x / pH: x  Gluc: 183 mg/dL / Ketone: x  / Bili: x / Urobili: x   Blood: x / Protein: x / Nitrite: x   Leuk Esterase: x / RBC: x / WBC x   Sq Epi: x / Non Sq Epi: x / Bacteria: x          RADIOLOGY & ADDITIONAL TESTS:  Lab Results Reviewed   Imaging Reviewed  Electrocardiogram Reviewed

## 2024-01-22 NOTE — PROGRESS NOTE ADULT - PROBLEM SELECTOR PLAN 4
Thyroid Function Tests:  01-22 @ 07:22 TSH 1.25 FreeT4 1.4 T3 -- Anti TPO <10.0 Anti Thyroglobulin Ab -- TSI --  01-19 @ 17:44 TSH 1.10 FreeT4 0.9 T3 -- Anti TPO <10.0 Anti Thyroglobulin Ab -- TSI --  Pt asymptomatic not on LT4 PTA.  Follow up levels as out pt

## 2024-01-22 NOTE — PROVIDER CONTACT NOTE (CRITICAL VALUE NOTIFICATION) - BACKGROUND
Pt is here for Type 2 diabetes mellitius with ketoacidois without coma.
Pt diagnosed with type 2 diabetes mellitus

## 2024-01-22 NOTE — PROGRESS NOTE ADULT - PROBLEM SELECTOR PLAN 1
-FS TID AC qhs  -Changed Lantus to 8 units qhs  -C/w admelog 3 units with meals hold it npo hold if not eating   -C/w low admelog correction scales before meals and bedtime and add 2am low correction to identify further hypoglycemia  Discharge:  - Basaglar 8 units plus Humalog based on ICR 1:15 and ISF 50 for BG target of 150. Pt has knowledge to do this.   - patient lost her insurance and has been unable to follow up with Dr Blair since april  -  consult for [facility] application for patient to receive free insulin   - Make sure pt has Rx for all DM supplies and insulins.  - [facility]  at [facility] on [address] phone :[phone]   -When insurance reinstated can follow up at Endocrine Practice at [address] [place] [address], [address], [place] [address]; Ph # [phone] with Dr. Blair

## 2024-01-22 NOTE — PROGRESS NOTE ADULT - SUBJECTIVE AND OBJECTIVE BOX
DIABETES FOLLOW UP NOTE: Saw pt earlier today    Chief Complaint: Endocrine consult requested for management of T1DM    INTERVAL HX: Pt stable, reports improving PO intake today. Noted FBG per BMP at 36 and 56 by POC ac breakfast> pt denies any s/sx of hypoglycemia. Feels better today.       Review of Systems:  General: As above  Cardiovascular: No chest pain, palpitations  Respiratory: No SOB, no cough  GI: No nausea, vomiting, abdominal pain  Endocrine: No polyuria, polydipsia or S&Sx of hypoglycemia    Allergies    No Known Allergies    Intolerances      MEDICATIONS:  insulin glargine Injectable (LANTUS) 8 Unit(s) SubCutaneous at bedtime  insulin lispro (ADMELOG) corrective regimen sliding scale   SubCutaneous <User Schedule>  insulin lispro (ADMELOG) corrective regimen sliding scale   SubCutaneous three times a day before meals  insulin lispro Injectable (ADMELOG) 3 Unit(s) SubCutaneous three times a day before meals      PHYSICAL EXAM:  VITALS: T(C): 36.8 (01-22-24 @ 11:30)  T(F): 98.3 (01-22-24 @ 11:30), Max: 98.4 (01-21-24 @ 20:03)  HR: 85 (01-22-24 @ 11:30) (85 - 102)  BP: 156/77 (01-22-24 @ 11:30) (149/67 - 156/77)  RR:  (18 - 18)  SpO2:  (93% - 99%)  Wt(kg): --  GENERAL: Female laying in bed in NAD  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   Abdomen: Soft, nontender, non distended  Extremities: Warm, no edema in all 4 exts  NEURO: A&O X3    LABS:  POCT Blood Glucose.: 97 mg/dL (01-22-24 @ 12:34)  POCT Blood Glucose.: 93 mg/dL (01-22-24 @ 11:59)  POCT Blood Glucose.: 209 mg/dL (01-22-24 @ 08:41)  POCT Blood Glucose.: 56 mg/dL (01-22-24 @ 08:09)  POCT Blood Glucose.: 119 mg/dL (01-21-24 @ 21:23)  POCT Blood Glucose.: 81 mg/dL (01-21-24 @ 16:51)  POCT Blood Glucose.: 84 mg/dL (01-21-24 @ 12:06)  POCT Blood Glucose.: 146 mg/dL (01-21-24 @ 08:00)  POCT Blood Glucose.: 200 mg/dL (01-20-24 @ 21:14)  POCT Blood Glucose.: 245 mg/dL (01-20-24 @ 17:36)  POCT Blood Glucose.: 284 mg/dL (01-20-24 @ 14:12)  POCT Blood Glucose.: 296 mg/dL (01-20-24 @ 12:02)  POCT Blood Glucose.: 308 mg/dL (01-20-24 @ 08:26)  POCT Blood Glucose.: 301 mg/dL (01-20-24 @ 03:27)  POCT Blood Glucose.: 236 mg/dL (01-19-24 @ 23:06)  POCT Blood Glucose.: 250 mg/dL (01-19-24 @ 22:16)  POCT Blood Glucose.: 297 mg/dL (01-19-24 @ 19:47)  POCT Blood Glucose.: 305 mg/dL (01-19-24 @ 18:18)  POCT Blood Glucose.: 327 mg/dL (01-19-24 @ 17:05)  POCT Blood Glucose.: 403 mg/dL (01-19-24 @ 15:12)  POCT Blood Glucose.: 421 mg/dL (01-19-24 @ 14:35)                            7.3    5.97  )-----------( 157      ( 22 Jan 2024 07:23 )             23.0       01-22    138  |  104  |  7   ----------------------------<  36<LL>  3.9   |  20<L>  |  0.42<L>    eGFR: 115    Ca    7.5<L>      01-22  Mg     1.6     01-22  Phos  0.8     01-22    TPro  4.5<L>  /  Alb  2.2<L>  /  TBili  0.3  /  DBili  x   /  AST  16  /  ALT  11  /  AlkPhos  57  01-22      Thyroid Function Tests:  01-22 @ 07:22 TSH 1.25 FreeT4 1.4 T3 -- Anti TPO <10.0 Anti Thyroglobulin Ab -- TSI --  01-19 @ 17:44 TSH 1.10 FreeT4 0.9 T3 -- Anti TPO <10.0 Anti Thyroglobulin Ab -- TSI --      A1C with Estimated Average Glucose Result: 12.7 % (01-19-24 @ 17:44)      Estimated Average Glucose: 318 mg/dL (01-19-24 @ 17:44)

## 2024-01-22 NOTE — PROGRESS NOTE ADULT - NSPROGADDITIONALINFOA_GEN_ALL_CORE
-Plan discussed with pt/team.  Contact info: [phone] (24/7). pager [phone]  Amion on [employer]  Teams on M-T-W-F. Unavailable Thu/Weekends/Holidays  Provided face to face education as well as assessed  pt/labs/meds and discussed plan with primary team  Adjusting insulin  Discharge plan  Follow up care

## 2024-01-23 DIAGNOSIS — R05.9 COUGH, UNSPECIFIED: ICD-10-CM

## 2024-01-23 LAB
ALBUMIN SERPL ELPH-MCNC: 3.2 G/DL — LOW (ref 3.3–5)
ALP SERPL-CCNC: 83 U/L — SIGNIFICANT CHANGE UP (ref 40–120)
ALT FLD-CCNC: 14 U/L — SIGNIFICANT CHANGE UP (ref 10–45)
ANION GAP SERPL CALC-SCNC: 13 MMOL/L — SIGNIFICANT CHANGE UP (ref 5–17)
AST SERPL-CCNC: 20 U/L — SIGNIFICANT CHANGE UP (ref 10–40)
BILIRUB SERPL-MCNC: 0.5 MG/DL — SIGNIFICANT CHANGE UP (ref 0.2–1.2)
BUN SERPL-MCNC: 9 MG/DL — SIGNIFICANT CHANGE UP (ref 7–23)
CALCIUM SERPL-MCNC: 8.2 MG/DL — LOW (ref 8.4–10.5)
CHLORIDE SERPL-SCNC: 105 MMOL/L — SIGNIFICANT CHANGE UP (ref 96–108)
CO2 SERPL-SCNC: 27 MMOL/L — SIGNIFICANT CHANGE UP (ref 22–31)
CREAT SERPL-MCNC: 0.63 MG/DL — SIGNIFICANT CHANGE UP (ref 0.5–1.3)
EGFR: 104 ML/MIN/1.73M2 — SIGNIFICANT CHANGE UP
GLUCOSE BLDC GLUCOMTR-MCNC: 112 MG/DL — HIGH (ref 70–99)
GLUCOSE BLDC GLUCOMTR-MCNC: 139 MG/DL — HIGH (ref 70–99)
GLUCOSE BLDC GLUCOMTR-MCNC: 143 MG/DL — HIGH (ref 70–99)
GLUCOSE BLDC GLUCOMTR-MCNC: 210 MG/DL — HIGH (ref 70–99)
GLUCOSE BLDC GLUCOMTR-MCNC: 44 MG/DL — CRITICAL LOW (ref 70–99)
GLUCOSE BLDC GLUCOMTR-MCNC: 94 MG/DL — SIGNIFICANT CHANGE UP (ref 70–99)
GLUCOSE SERPL-MCNC: 104 MG/DL — HIGH (ref 70–99)
HCT VFR BLD CALC: 33.7 % — LOW (ref 34.5–45)
HGB BLD-MCNC: 10.3 G/DL — LOW (ref 11.5–15.5)
MAGNESIUM SERPL-MCNC: 1.9 MG/DL — SIGNIFICANT CHANGE UP (ref 1.6–2.6)
MCHC RBC-ENTMCNC: 28.8 PG — SIGNIFICANT CHANGE UP (ref 27–34)
MCHC RBC-ENTMCNC: 30.6 GM/DL — LOW (ref 32–36)
MCV RBC AUTO: 94.1 FL — SIGNIFICANT CHANGE UP (ref 80–100)
NRBC # BLD: 0 /100 WBCS — SIGNIFICANT CHANGE UP (ref 0–0)
PHOSPHATE SERPL-MCNC: 2.1 MG/DL — LOW (ref 2.5–4.5)
PLATELET # BLD AUTO: 275 K/UL — SIGNIFICANT CHANGE UP (ref 150–400)
POTASSIUM SERPL-MCNC: 4.1 MMOL/L — SIGNIFICANT CHANGE UP (ref 3.5–5.3)
POTASSIUM SERPL-SCNC: 4.1 MMOL/L — SIGNIFICANT CHANGE UP (ref 3.5–5.3)
PROT SERPL-MCNC: 6.3 G/DL — SIGNIFICANT CHANGE UP (ref 6–8.3)
RBC # BLD: 3.58 M/UL — LOW (ref 3.8–5.2)
RBC # FLD: 12.7 % — SIGNIFICANT CHANGE UP (ref 10.3–14.5)
SODIUM SERPL-SCNC: 145 MMOL/L — SIGNIFICANT CHANGE UP (ref 135–145)
WBC # BLD: 9.92 K/UL — SIGNIFICANT CHANGE UP (ref 3.8–10.5)
WBC # FLD AUTO: 9.92 K/UL — SIGNIFICANT CHANGE UP (ref 3.8–10.5)

## 2024-01-23 PROCEDURE — 99232 SBSQ HOSP IP/OBS MODERATE 35: CPT

## 2024-01-23 PROCEDURE — 99232 SBSQ HOSP IP/OBS MODERATE 35: CPT | Mod: GC

## 2024-01-23 RX ORDER — INSULIN GLARGINE 100 [IU]/ML
6 INJECTION, SOLUTION SUBCUTANEOUS AT BEDTIME
Refills: 0 | Status: DISCONTINUED | OUTPATIENT
Start: 2024-01-23 | End: 2024-01-24

## 2024-01-23 RX ORDER — DEXTROSE 50 % IN WATER 50 %
15 SYRINGE (ML) INTRAVENOUS ONCE
Refills: 0 | Status: DISCONTINUED | OUTPATIENT
Start: 2024-01-23 | End: 2024-01-24

## 2024-01-23 RX ORDER — DEXTROSE 50 % IN WATER 50 %
15 SYRINGE (ML) INTRAVENOUS ONCE
Refills: 0 | Status: COMPLETED | OUTPATIENT
Start: 2024-01-23 | End: 2024-01-23

## 2024-01-23 RX ORDER — POTASSIUM PHOSPHATE, MONOBASIC POTASSIUM PHOSPHATE, DIBASIC 236; 224 MG/ML; MG/ML
15 INJECTION, SOLUTION INTRAVENOUS ONCE
Refills: 0 | Status: COMPLETED | OUTPATIENT
Start: 2024-01-23 | End: 2024-01-23

## 2024-01-23 RX ORDER — IBUPROFEN 200 MG
200 TABLET ORAL ONCE
Refills: 0 | Status: COMPLETED | OUTPATIENT
Start: 2024-01-23 | End: 2024-01-23

## 2024-01-23 RX ADMIN — Medication 100 MILLIGRAM(S): at 05:48

## 2024-01-23 RX ADMIN — Medication 600 MILLIGRAM(S): at 18:21

## 2024-01-23 RX ADMIN — Medication 200 MILLIGRAM(S): at 05:48

## 2024-01-23 RX ADMIN — SODIUM CHLORIDE 4 MILLILITER(S): 9 INJECTION INTRAMUSCULAR; INTRAVENOUS; SUBCUTANEOUS at 05:49

## 2024-01-23 RX ADMIN — Medication 200 MILLIGRAM(S): at 06:30

## 2024-01-23 RX ADMIN — Medication 100 MILLIGRAM(S): at 21:37

## 2024-01-23 RX ADMIN — Medication 600 MILLIGRAM(S): at 05:48

## 2024-01-23 RX ADMIN — Medication 100 MILLIGRAM(S): at 13:19

## 2024-01-23 RX ADMIN — INSULIN GLARGINE 6 UNIT(S): 100 INJECTION, SOLUTION SUBCUTANEOUS at 21:37

## 2024-01-23 RX ADMIN — Medication 15 GRAM(S): at 05:49

## 2024-01-23 NOTE — PROVIDER CONTACT NOTE (MEDICATION) - ASSESSMENT
Pt remains A+Ox4, pt ambulating around room independently and frequently. Pt able to verbalize medication education
Pt AOx4. Verbalized decreased PO intake.

## 2024-01-23 NOTE — PROGRESS NOTE ADULT - PROBLEM SELECTOR PLAN 4
-TSH/T4  WNL this admission  - Negative TPO antibodies <10 - Denies currently being on Statin, but was in remote past Primary Defect Length In Cm (Final Defect Size - Required For Flaps/Grafts): 2.1

## 2024-01-23 NOTE — PROGRESS NOTE ADULT - SUBJECTIVE AND OBJECTIVE BOX
seen earlier today     Chief Complaint: Diabetes Mellitus follow up    INTERVAL HX: patient reports improved po intake, asking for discharge to home,  noted patient received lantus 8 units when bg was <120, order not followed ot reduce lantus to 5 units if BG <120 & patient with hypoglycemia to 44 overnight denies symptoms reports she was sleeping . premeal insulin held by staff for bg tightly controlled/ not eating however patient reports improving po intake, BG remains tightly controlled       Review of Systems:  General: As above  GI: No nausea, vomiting  Endocrine: no  S&Sx of hypoglycemia    Allergies    No Known Allergies    Intolerances      MEDICATIONS  (STANDING):  benzonatate 100 milliGRAM(s) Oral three times a day  dextrose 5%. 1000 milliLiter(s) (50 mL/Hr) IV Continuous <Continuous>  dextrose 5%. 1000 milliLiter(s) (100 mL/Hr) IV Continuous <Continuous>  dextrose 50% Injectable 25 Gram(s) IV Push once  dextrose 50% Injectable 12.5 Gram(s) IV Push once  dextrose 50% Injectable 25 Gram(s) IV Push once  enoxaparin Injectable 40 milliGRAM(s) SubCutaneous every 24 hours  glucagon  Injectable 1 milliGRAM(s) IntraMuscular once  guaiFENesin  milliGRAM(s) Oral every 12 hours  insulin glargine Injectable (LANTUS) 8 Unit(s) SubCutaneous at bedtime  insulin lispro (ADMELOG) corrective regimen sliding scale   SubCutaneous <User Schedule>  insulin lispro (ADMELOG) corrective regimen sliding scale   SubCutaneous three times a day before meals  insulin lispro Injectable (ADMELOG) 3 Unit(s) SubCutaneous three times a day before meals  pantoprazole   Suspension 40 milliGRAM(s) Oral daily  potassium phosphate IVPB 15 milliMole(s) IV Intermittent once        dextrose Oral Gel   15 Gram(s) Oral (01-23-24 @ 05:49)    insulin glargine Injectable (LANTUS)   8 Unit(s) SubCutaneous (01-22-24 @ 21:53)        PHYSICAL EXAM:  VITALS: T(C): 37.1 (01-23-24 @ 05:42)  T(F): 98.7 (01-23-24 @ 05:42), Max: 99.4 (01-22-24 @ 20:41)  HR: 98 (01-23-24 @ 05:42) (98 - 109)  BP: 171/72 (01-23-24 @ 05:42) (160/73 - 171/72)  RR:  (18 - 18)  SpO2:  (91% - 92%)  Wt(kg): --  GENERAL: NAD  Respiratory: Respirations unlabored   Extremities: Warm, no edema  NEURO: Alert , appropriate     LABS:  POCT Blood Glucose.: 143 mg/dL (01-23-24 @ 12:24)  POCT Blood Glucose.: 94 mg/dL (01-23-24 @ 08:08)  POCT Blood Glucose.: 112 mg/dL (01-23-24 @ 05:47)  POCT Blood Glucose.: 44 mg/dL (01-23-24 @ 04:56)  POCT Blood Glucose.: 116 mg/dL (01-22-24 @ 21:25)  POCT Blood Glucose.: 80 mg/dL (01-22-24 @ 16:47)  POCT Blood Glucose.: 97 mg/dL (01-22-24 @ 12:34)  POCT Blood Glucose.: 93 mg/dL (01-22-24 @ 11:59)  POCT Blood Glucose.: 209 mg/dL (01-22-24 @ 08:41)  POCT Blood Glucose.: 56 mg/dL (01-22-24 @ 08:09)  POCT Blood Glucose.: 119 mg/dL (01-21-24 @ 21:23)  POCT Blood Glucose.: 81 mg/dL (01-21-24 @ 16:51)  POCT Blood Glucose.: 84 mg/dL (01-21-24 @ 12:06)  POCT Blood Glucose.: 146 mg/dL (01-21-24 @ 08:00)  POCT Blood Glucose.: 200 mg/dL (01-20-24 @ 21:14)  POCT Blood Glucose.: 245 mg/dL (01-20-24 @ 17:36)  POCT Blood Glucose.: 284 mg/dL (01-20-24 @ 14:12)                          10.3   9.92  )-----------( 275      ( 23 Jan 2024 06:57 )             33.7     01-23    145  |  105  |  9   ----------------------------<  104<H>  4.1   |  27  |  0.63    Ca    8.2<L>      23 Jan 2024 06:56  Phos  2.1     01-23  Mg     1.9     01-23    TPro  6.3  /  Alb  3.2<L>  /  TBili  0.5  /  DBili  x   /  AST  20  /  ALT  14  /  AlkPhos  83  01-23    eGFR: 104 mL/min/1.73m2 (23 Jan 2024 06:56)  eGFR: 104 mL/min/1.73m2 (22 Jan 2024 17:21)      Thyroid Function Tests:  01-22 @ 07:22 TSH 1.25 FreeT4 1.4 T3 -- Anti TPO <10.0 Anti Thyroglobulin Ab -- TSI --  01-19 @ 17:44 TSH 1.10 FreeT4 0.9 T3 -- Anti TPO <10.0 Anti Thyroglobulin Ab -- TSI --      A1C with Estimated Average Glucose Result: 12.7 % (01-19-24 @ 17:44)    Estimated Average Glucose: 318 mg/dL (01-19-24 @ 17:44)        Diet, Regular:   Consistent Carbohydrate No Snacks (CSTCHO) (01-20-24 @ 14:58) [Active]

## 2024-01-23 NOTE — PROGRESS NOTE ADULT - PROBLEM SELECTOR PLAN 2
- Has history of elevated albumin/creatinine ratio in April 2023  - BPs WNL this admission w/ BP target <130/80  - Consider ACEi/ARB therapy on discharge (denies taking in years and reports having "white coat syndrome and not true HTN) - Has history of elevated albumin/creatinine ratio in April 2023  -Patient reports that her BP is elevated due to cough and associated pain. Does not want to start medication at this time. Will likely benefit from ACE/ARB.   - Consider ACEi/ARB therapy on discharge (denies taking in years and reports having "white coat syndrome and not true HTN)

## 2024-01-23 NOTE — PROGRESS NOTE ADULT - PROBLEM SELECTOR PLAN 5
DVT PPx: Lovenox 40mg QD (but pt declining at this time)  Diet: NPO for now --> CC PO diet as tolerated  Bowel Regimen: PRN Miralax Qd   Code: Full    Dispo: Pending hospital Course --> likely dc home w/o home needs as pt ambulatory and performs all ADLs at baseline -TSH/T4  WNL this admission  - Negative TPO antibodies <10

## 2024-01-23 NOTE — PROGRESS NOTE ADULT - ASSESSMENT
57 yo F /w/h/o uncontrolled T1DM (A1C 12.7%) on Basaglar/Admelog. Pt didn't take insulin since she got sick. DM c/b retinopathy, nephropathy, neuropathy. Also h/o subclinical hypothyroidism, HTN, and HLD. Here with DKA, prompting endocrinology consult. BG Goal 100-180mg/dl    Improving PO intake with hypoglycemia over night after lantus dose not per order discussed with team

## 2024-01-23 NOTE — PROGRESS NOTE ADULT - PROBLEM SELECTOR PLAN 1
resolved   Plan  -Endocrine on board-appreciate further recommendations  -On Basal/bolus- will adjust as needed   -Encouraging PO Intake resolved   Plan  -Endocrine on board-appreciate further recommendations  -On Basal/bolus- 8 lantus and 3 pre-meal. If BG Below 120, will give 5 units Lantus instead  -Encouraging PO Intake

## 2024-01-23 NOTE — PROGRESS NOTE ADULT - SUBJECTIVE AND OBJECTIVE BOX
PROGRESS NOTE:   Authored by Paulo Odom MD  Patient is a 58y old  Female who presents with a chief complaint of DKA (22 Jan 2024 14:20)      SUBJECTIVE / OVERNIGHT EVENTS:  No acute events overnight.     ADDITIONAL REVIEW OF SYSTEMS:    MEDICATIONS  (STANDING):  benzonatate 100 milliGRAM(s) Oral three times a day  dextrose 5%. 1000 milliLiter(s) (100 mL/Hr) IV Continuous <Continuous>  dextrose 5%. 1000 milliLiter(s) (50 mL/Hr) IV Continuous <Continuous>  dextrose 50% Injectable 25 Gram(s) IV Push once  dextrose 50% Injectable 12.5 Gram(s) IV Push once  dextrose 50% Injectable 25 Gram(s) IV Push once  enoxaparin Injectable 40 milliGRAM(s) SubCutaneous every 24 hours  glucagon  Injectable 1 milliGRAM(s) IntraMuscular once  guaiFENesin  milliGRAM(s) Oral every 12 hours  insulin glargine Injectable (LANTUS) 8 Unit(s) SubCutaneous at bedtime  insulin lispro (ADMELOG) corrective regimen sliding scale   SubCutaneous <User Schedule>  insulin lispro (ADMELOG) corrective regimen sliding scale   SubCutaneous three times a day before meals  insulin lispro Injectable (ADMELOG) 3 Unit(s) SubCutaneous three times a day before meals  pantoprazole   Suspension 40 milliGRAM(s) Oral daily    MEDICATIONS  (PRN):  aluminum hydroxide/magnesium hydroxide/simethicone Suspension 30 milliLiter(s) Oral every 6 hours PRN Dyspepsia  dextrose Oral Gel 15 Gram(s) Oral once PRN Blood Glucose LESS THAN 70 milliGRAM(s)/deciliter  dextrose Oral Gel 15 Gram(s) Oral once PRN Blood Glucose LESS THAN 70 milliGRAM(s)/deciliter      CAPILLARY BLOOD GLUCOSE      POCT Blood Glucose.: 112 mg/dL (23 Jan 2024 05:47)  POCT Blood Glucose.: 44 mg/dL (23 Jan 2024 04:56)  POCT Blood Glucose.: 116 mg/dL (22 Jan 2024 21:25)  POCT Blood Glucose.: 80 mg/dL (22 Jan 2024 16:47)  POCT Blood Glucose.: 97 mg/dL (22 Jan 2024 12:34)  POCT Blood Glucose.: 93 mg/dL (22 Jan 2024 11:59)  POCT Blood Glucose.: 209 mg/dL (22 Jan 2024 08:41)  POCT Blood Glucose.: 56 mg/dL (22 Jan 2024 08:09)    I&O's Summary      PHYSICAL EXAM:  Vital Signs Last 24 Hrs  T(C): 37.1 (23 Jan 2024 05:42), Max: 37.4 (22 Jan 2024 20:41)  T(F): 98.7 (23 Jan 2024 05:42), Max: 99.4 (22 Jan 2024 20:41)  HR: 98 (23 Jan 2024 05:42) (85 - 109)  BP: 171/72 (23 Jan 2024 05:42) (156/77 - 171/72)  BP(mean): --  RR: 18 (23 Jan 2024 05:42) (18 - 18)  SpO2: 92% (23 Jan 2024 05:42) (91% - 99%)    Parameters below as of 23 Jan 2024 05:42  Patient On (Oxygen Delivery Method): room air        GENERAL: No apparent distress.   HEAD:  Atraumatic, Normocephalic  EYES: EOMI, PERRLA, conjunctiva and sclera clear  NECK: Supple, no lymphadenopathy, no elevated JVP  CHEST/LUNG: Clear to auscultation bilateral and symmetric; No wheezes, rales, or rhonchi  HEART: S1 and S2 normal. Regular rate and rhythm; No murmurs, rubs, or gallops  ABDOMEN: Soft, non-tender, non-distended; normal bowel sounds  EXTREMITIES:  2+ peripheral pulses b/l, No clubbing, cyanosis, or edema  NEUROLOGY: A&O x 3, no focal deficits  SKIN: No rashes or lesions    LABS:                        10.9   9.45  )-----------( 257      ( 22 Jan 2024 17:21 )             33.9     01-22    144  |  106  |  9   ----------------------------<  95  4.9   |  29  |  0.62    Ca    8.4      22 Jan 2024 17:21  Phos  2.7     01-22  Mg     2.0     01-22    TPro  6.5  /  Alb  3.2<L>  /  TBili  0.4  /  DBili  x   /  AST  22  /  ALT  15  /  AlkPhos  81  01-22          Urinalysis Basic - ( 22 Jan 2024 17:21 )    Color: x / Appearance: x / SG: x / pH: x  Gluc: 95 mg/dL / Ketone: x  / Bili: x / Urobili: x   Blood: x / Protein: x / Nitrite: x   Leuk Esterase: x / RBC: x / WBC x   Sq Epi: x / Non Sq Epi: x / Bacteria: x          RADIOLOGY & ADDITIONAL TESTS:  Lab Results Reviewed   Imaging Reviewed  Electrocardiogram Reviewed   PROGRESS NOTE:   Authored by Paulo Odom MD  Patient is a 58y old  Female who presents with a chief complaint of DKA (22 Jan 2024 14:20)      SUBJECTIVE / OVERNIGHT EVENTS:  Patient had an episode of hypoglycemia this AM. Resolved after patient drank some juice and dextrose gel was administered. She states that her appetite is improving, and tolerated dinner without N/V. She has been having a productive cough. Denies any chest pain, fevers or chills.     ADDITIONAL REVIEW OF SYSTEMS: All other systems negative unless otherwise specified.     MEDICATIONS  (STANDING):  benzonatate 100 milliGRAM(s) Oral three times a day  dextrose 5%. 1000 milliLiter(s) (100 mL/Hr) IV Continuous <Continuous>  dextrose 5%. 1000 milliLiter(s) (50 mL/Hr) IV Continuous <Continuous>  dextrose 50% Injectable 25 Gram(s) IV Push once  dextrose 50% Injectable 12.5 Gram(s) IV Push once  dextrose 50% Injectable 25 Gram(s) IV Push once  enoxaparin Injectable 40 milliGRAM(s) SubCutaneous every 24 hours  glucagon  Injectable 1 milliGRAM(s) IntraMuscular once  guaiFENesin  milliGRAM(s) Oral every 12 hours  insulin glargine Injectable (LANTUS) 8 Unit(s) SubCutaneous at bedtime  insulin lispro (ADMELOG) corrective regimen sliding scale   SubCutaneous <User Schedule>  insulin lispro (ADMELOG) corrective regimen sliding scale   SubCutaneous three times a day before meals  insulin lispro Injectable (ADMELOG) 3 Unit(s) SubCutaneous three times a day before meals  pantoprazole   Suspension 40 milliGRAM(s) Oral daily    MEDICATIONS  (PRN):  aluminum hydroxide/magnesium hydroxide/simethicone Suspension 30 milliLiter(s) Oral every 6 hours PRN Dyspepsia  dextrose Oral Gel 15 Gram(s) Oral once PRN Blood Glucose LESS THAN 70 milliGRAM(s)/deciliter  dextrose Oral Gel 15 Gram(s) Oral once PRN Blood Glucose LESS THAN 70 milliGRAM(s)/deciliter      CAPILLARY BLOOD GLUCOSE      POCT Blood Glucose.: 112 mg/dL (23 Jan 2024 05:47)  POCT Blood Glucose.: 44 mg/dL (23 Jan 2024 04:56)  POCT Blood Glucose.: 116 mg/dL (22 Jan 2024 21:25)  POCT Blood Glucose.: 80 mg/dL (22 Jan 2024 16:47)  POCT Blood Glucose.: 97 mg/dL (22 Jan 2024 12:34)  POCT Blood Glucose.: 93 mg/dL (22 Jan 2024 11:59)  POCT Blood Glucose.: 209 mg/dL (22 Jan 2024 08:41)  POCT Blood Glucose.: 56 mg/dL (22 Jan 2024 08:09)    I&O's Summary      PHYSICAL EXAM:  Vital Signs Last 24 Hrs  T(C): 37.1 (23 Jan 2024 05:42), Max: 37.4 (22 Jan 2024 20:41)  T(F): 98.7 (23 Jan 2024 05:42), Max: 99.4 (22 Jan 2024 20:41)  HR: 98 (23 Jan 2024 05:42) (85 - 109)  BP: 171/72 (23 Jan 2024 05:42) (156/77 - 171/72)  BP(mean): --  RR: 18 (23 Jan 2024 05:42) (18 - 18)  SpO2: 92% (23 Jan 2024 05:42) (91% - 99%)    Parameters below as of 23 Jan 2024 05:42  Patient On (Oxygen Delivery Method): room air        GENERAL: No apparent distress.   HEAD:  Atraumatic, Normocephalic  EYES: EOMI, PERRLA, conjunctiva and sclera clear  NECK: Supple, no lymphadenopathy, no elevated JVP  CHEST/LUNG: Clear to auscultation bilateral and symmetric; No wheezes, rales, or rhonchi  HEART: S1 and S2 normal. Regular rate and rhythm; No murmurs, rubs, or gallops  ABDOMEN: Soft, non-tender, non-distended; normal bowel sounds  EXTREMITIES:  2+ peripheral pulses b/l, No clubbing, cyanosis, or edema  NEUROLOGY: A&O x 3, no focal deficits  SKIN: No rashes or lesions    LABS:                        10.9   9.45  )-----------( 257      ( 22 Jan 2024 17:21 )             33.9     01-22    144  |  106  |  9   ----------------------------<  95  4.9   |  29  |  0.62    Ca    8.4      22 Jan 2024 17:21  Phos  2.7     01-22  Mg     2.0     01-22    TPro  6.5  /  Alb  3.2<L>  /  TBili  0.4  /  DBili  x   /  AST  22  /  ALT  15  /  AlkPhos  81  01-22          Urinalysis Basic - ( 22 Jan 2024 17:21 )    Color: x / Appearance: x / SG: x / pH: x  Gluc: 95 mg/dL / Ketone: x  / Bili: x / Urobili: x   Blood: x / Protein: x / Nitrite: x   Leuk Esterase: x / RBC: x / WBC x   Sq Epi: x / Non Sq Epi: x / Bacteria: x          RADIOLOGY & ADDITIONAL TESTS:  Lab Results Reviewed   Imaging Reviewed  Electrocardiogram Reviewed

## 2024-01-23 NOTE — PROGRESS NOTE ADULT - PROBLEM SELECTOR PLAN 4
Thyroid Function Tests:  01-22 @ 07:22 TSH 1.25 FreeT4 1.4 T3 -- Anti TPO <10.0 Anti Thyroglobulin Ab -- TSI --  01-19 @ 17:44 TSH 1.10 FreeT4 0.9 T3 -- Anti TPO <10.0 Anti Thyroglobulin Ab -- TSI --  Pt asymptomatic not on LT4 PTA.  Follow up levels as out patient      discussed with patient and primary team   Contact via [employer] Teams during business hours  To reach covering provider access AMION via [employer] tools  For Urgent matters/after-hours/weekends/holidays please page endocrine fellow on call   For nonurgent matters please email [email or web address]    Please note that this patient may be followed by different provider tomorrow.  Notify endocrine 24 hours prior to discharge for final recommendations

## 2024-01-23 NOTE — PROGRESS NOTE ADULT - ASSESSMENT
58y F hx of T1DM, subclinical hypothyroidism, HTN, HLD, who presented to the ED for cough, general malaise, and n/v x3-4 days w/ influenza+ contacts found to be in DKA on admission  i/s/o likely flu and missed insulin dosing. Admitted to medicine s/p IVF 2L bolus and on maintenance IVF with basal/bolus insulin.  58y F hx of T1DM, subclinical hypothyroidism, HTN, HLD, who presented to the ED for cough, general malaise, and n/v x3-4 days w/ influenza+ contacts found to be in DKA on admission  i/s/o likely flu and missed insulin dosing. Admitted to medicine s/p IVF 2L bolus and on maintenance IVF with basal/bolus insulin. DKA now resolved. However, patient continues to have episodes of hypoglycemia. Will need further monitoring to determine outpatient diabetes regimen.

## 2024-01-24 ENCOUNTER — TRANSCRIPTION ENCOUNTER (OUTPATIENT)
Age: 59
End: 2024-01-24

## 2024-01-24 VITALS
RESPIRATION RATE: 18 BRPM | DIASTOLIC BLOOD PRESSURE: 68 MMHG | TEMPERATURE: 99 F | HEART RATE: 93 BPM | OXYGEN SATURATION: 93 % | SYSTOLIC BLOOD PRESSURE: 151 MMHG

## 2024-01-24 LAB
GLUCOSE BLDC GLUCOMTR-MCNC: 169 MG/DL — HIGH (ref 70–99)
GLUCOSE BLDC GLUCOMTR-MCNC: 184 MG/DL — HIGH (ref 70–99)
GLUCOSE BLDC GLUCOMTR-MCNC: 88 MG/DL — SIGNIFICANT CHANGE UP (ref 70–99)

## 2024-01-24 PROCEDURE — 71045 X-RAY EXAM CHEST 1 VIEW: CPT

## 2024-01-24 PROCEDURE — 84443 ASSAY THYROID STIM HORMONE: CPT

## 2024-01-24 PROCEDURE — 84484 ASSAY OF TROPONIN QUANT: CPT

## 2024-01-24 PROCEDURE — 94640 AIRWAY INHALATION TREATMENT: CPT

## 2024-01-24 PROCEDURE — 86901 BLOOD TYPING SEROLOGIC RH(D): CPT

## 2024-01-24 PROCEDURE — 80048 BASIC METABOLIC PNL TOTAL CA: CPT

## 2024-01-24 PROCEDURE — 82947 ASSAY GLUCOSE BLOOD QUANT: CPT

## 2024-01-24 PROCEDURE — 80053 COMPREHEN METABOLIC PANEL: CPT

## 2024-01-24 PROCEDURE — 83036 HEMOGLOBIN GLYCOSYLATED A1C: CPT

## 2024-01-24 PROCEDURE — 82330 ASSAY OF CALCIUM: CPT

## 2024-01-24 PROCEDURE — 86376 MICROSOMAL ANTIBODY EACH: CPT

## 2024-01-24 PROCEDURE — 36415 COLL VENOUS BLD VENIPUNCTURE: CPT

## 2024-01-24 PROCEDURE — 85014 HEMATOCRIT: CPT

## 2024-01-24 PROCEDURE — 84439 ASSAY OF FREE THYROXINE: CPT

## 2024-01-24 PROCEDURE — 86850 RBC ANTIBODY SCREEN: CPT

## 2024-01-24 PROCEDURE — 99285 EMERGENCY DEPT VISIT HI MDM: CPT

## 2024-01-24 PROCEDURE — 99239 HOSP IP/OBS DSCHRG MGMT >30: CPT | Mod: GC

## 2024-01-24 PROCEDURE — 96374 THER/PROPH/DIAG INJ IV PUSH: CPT

## 2024-01-24 PROCEDURE — 83690 ASSAY OF LIPASE: CPT

## 2024-01-24 PROCEDURE — 83735 ASSAY OF MAGNESIUM: CPT

## 2024-01-24 PROCEDURE — 99232 SBSQ HOSP IP/OBS MODERATE 35: CPT

## 2024-01-24 PROCEDURE — 86900 BLOOD TYPING SEROLOGIC ABO: CPT

## 2024-01-24 PROCEDURE — 93005 ELECTROCARDIOGRAM TRACING: CPT

## 2024-01-24 PROCEDURE — 82435 ASSAY OF BLOOD CHLORIDE: CPT

## 2024-01-24 PROCEDURE — 84132 ASSAY OF SERUM POTASSIUM: CPT

## 2024-01-24 PROCEDURE — 84100 ASSAY OF PHOSPHORUS: CPT

## 2024-01-24 PROCEDURE — 85027 COMPLETE CBC AUTOMATED: CPT

## 2024-01-24 PROCEDURE — 83605 ASSAY OF LACTIC ACID: CPT

## 2024-01-24 PROCEDURE — 82010 KETONE BODYS QUAN: CPT

## 2024-01-24 PROCEDURE — 96375 TX/PRO/DX INJ NEW DRUG ADDON: CPT

## 2024-01-24 PROCEDURE — 82803 BLOOD GASES ANY COMBINATION: CPT

## 2024-01-24 PROCEDURE — 85025 COMPLETE CBC W/AUTO DIFF WBC: CPT

## 2024-01-24 PROCEDURE — 85018 HEMOGLOBIN: CPT

## 2024-01-24 PROCEDURE — 84295 ASSAY OF SERUM SODIUM: CPT

## 2024-01-24 PROCEDURE — 82962 GLUCOSE BLOOD TEST: CPT

## 2024-01-24 PROCEDURE — 84702 CHORIONIC GONADOTROPIN TEST: CPT

## 2024-01-24 RX ORDER — INSULIN GLARGINE 100 [IU]/ML
4 INJECTION, SOLUTION SUBCUTANEOUS
Qty: 5 | Refills: 0
Start: 2024-01-24 | End: 2024-02-22

## 2024-01-24 RX ORDER — INSULIN LISPRO 100/ML
2 VIAL (ML) SUBCUTANEOUS
Qty: 5 | Refills: 0
Start: 2024-01-24 | End: 2024-02-22

## 2024-01-24 RX ORDER — GLUCAGON INJECTION, SOLUTION 0.5 MG/.1ML
1 INJECTION, SOLUTION SUBCUTANEOUS
Qty: 1 | Refills: 0
Start: 2024-01-24

## 2024-01-24 RX ORDER — INSULIN GLARGINE 100 [IU]/ML
0 INJECTION, SOLUTION SUBCUTANEOUS
Qty: 0 | Refills: 0 | DISCHARGE

## 2024-01-24 RX ORDER — INSULIN LISPRO 100/ML
0 VIAL (ML) SUBCUTANEOUS
Qty: 0 | Refills: 0 | DISCHARGE

## 2024-01-24 RX ORDER — INSULIN GLARGINE 100 [IU]/ML
4 INJECTION, SOLUTION SUBCUTANEOUS AT BEDTIME
Refills: 0 | Status: DISCONTINUED | OUTPATIENT
Start: 2024-01-24 | End: 2024-01-24

## 2024-01-24 RX ORDER — INSULIN LISPRO 100/ML
2 VIAL (ML) SUBCUTANEOUS
Refills: 0 | Status: DISCONTINUED | OUTPATIENT
Start: 2024-01-24 | End: 2024-01-24

## 2024-01-24 RX ADMIN — Medication 100 MILLIGRAM(S): at 13:54

## 2024-01-24 RX ADMIN — Medication 100 MILLIGRAM(S): at 05:06

## 2024-01-24 RX ADMIN — Medication 600 MILLIGRAM(S): at 05:07

## 2024-01-24 NOTE — PROGRESS NOTE ADULT - ATTENDING COMMENTS
58y F hx of T1DM, subclinical hypothyroidism, HTN, HLD, who presented to the ED for cough, general malaise, and n/v x3-4 days w/ influenza+ contacts found to be in DKA on admission  i/s/o likely flu and missed insulin dosing. DKA now resolved on basal/bolus insulin regimen. With minimal PO intake yesterday, improved this am as per patient. Was hypoglycemic, improved with dextrose. Additionally with severe hypophosphatemia, repleated, repeat labs in afternoon. Also had drop in H/H, no signs of bleeding. Repeat CBC in PM. Endo f/u for insulin dosing. D/c in next 24 hours should PO intake improve, phosphorus improve and on stable insulin regimen.
58y F hx of T1DM, subclinical hypothyroidism, HTN, HLD, who presented to the ED for cough, general malaise, and n/v x3-4 days w/ influenza+ contacts found to be in DKA on admission  i/s/o likely flu and missed insulin dosing. DKA now resolved on basal/bolus insulin regimen. Encourage PO intake, d/c IVF. Endo following, f/u rec's regarding d/c insulin plan. Likely d/c home in next 24 hours should symptoms continue to improve
58y F hx of T1DM, subclinical hypothyroidism, HTN, HLD, who presented to the ED for cough, general malaise, and n/v x3-4 days w/ influenza+ contacts found to be in DKA on admission  i/s/o likely flu and missed insulin dosing. DKA now resolved on basal/bolus insulin regimen. FS now better over last 24 hrs, less hypoglycemic this am. Appetite improving. Endo following, adjusting insulin therapy. f/u rec's for d/c dosing. c/w symptomatic care for viral respiratory infection    Anticipate d/c home today pending final endo rec's     d/c time 34 minutes
58y F hx of T1DM, subclinical hypothyroidism, HTN, HLD, who presented to the ED for cough, general malaise, and n/v x3-4 days w/ influenza+ contacts found to be in DKA on admission  i/s/o likely flu and missed insulin dosing. DKA now resolved on basal/bolus insulin regimen. Still remains with somewhat erratic PO intake and labile sugars. Endo following, adjusting insulin therapy. Plan for discharge when on stable insulin regimen and FS controlled.

## 2024-01-24 NOTE — PROGRESS NOTE ADULT - PROBLEM SELECTOR PLAN 1
resolved   Plan  -Endocrine on board-appreciate further recommendations  -On Basal/bolus- 8 lantus and 3 pre-meal. If BG Below 120, will give 5 units Lantus instead  -Encouraging PO Intake resolved   Plan  -Endocrine on board-appreciate further recommendations  -on basal/bolus   -Will discuss d/c regimen with endocrine

## 2024-01-24 NOTE — PROGRESS NOTE ADULT - PROBLEM SELECTOR PLAN 1
-FS TID AC qhs  -Change Lantus to 4 units qhs  -Change admelog dose to 2 units with meals hold it npo hold if not eating   -C/w low admelog correction scales before meals and bedtime and add 2am low correction to identify further hypoglycemia  Discharge:  - Basaglar 4 units plus Humalog 2 units ac meals> then when PO intake improves can go back to ICR 1:15 and ISF 50 for BG target of 150. Pt has knowledge to do this.   - patient lost her insurance and has been unable to follow up with Dr Blair since April  -  consult for [facility] application for patient to receive free insulin. Per team insulin will be delivered to pt's house in next 2 days. Pt has a Basaglar and  a Humalog insulin pen at home   - Make sure pt has Rx for all DM supplies and insulins.  - [facility]  at [facility] on [address] phone :[phone]   -When insurance reinstated can follow up at Endocrine Practice at [address] [place] [address], [address], [place] [address]; Ph # [phone] with Dr. Blair

## 2024-01-24 NOTE — PROGRESS NOTE ADULT - PROBLEM SELECTOR PLAN 6
DVT PPx: Lovenox 40mg QD (but pt declining at this time)  Diet: NPO for now --> CC PO diet as tolerated  Bowel Regimen: PRN Miralax Qd   Code: Full    Dispo: Pending hospital Course --> likely dc home w/o home needs as pt ambulatory and performs all ADLs at baseline
DVT PPx: Lovenox 40mg QD (but pt declining at this time)  Diet: NPO for now --> CC PO diet as tolerated  Bowel Regimen: PRN Miralax Qd   Code: Full    Dispo: Pending hospital Course --> likely dc home w/o home needs as pt ambulatory and performs all ADLs at baseline

## 2024-01-24 NOTE — PROGRESS NOTE ADULT - PROBLEM SELECTOR PLAN 2
- Has history of elevated albumin/creatinine ratio in April 2023  -Patient reports that her BP is elevated due to cough and associated pain. Does not want to start medication at this time. Will likely benefit from ACE/ARB.   - Consider ACEi/ARB therapy on discharge (denies taking in years and reports having "white coat syndrome and not true HTN)

## 2024-01-24 NOTE — PROGRESS NOTE ADULT - SUBJECTIVE AND OBJECTIVE BOX
DIABETES FOLLOW UP NOTE: Saw pt earlier today    Chief Complaint: Endocrine consult requested for management of T2DM    INTERVAL HX: Pt stable, reports improving PO intake today. Noted FBG 88 by POC ac breakfast> pt denies any s/sx of hypoglycemia. Pt wants to go home today. Has on/off cough> on cough med    Review of Systems:  General: As above  Cardiovascular: No chest pain, palpitations  Respiratory: No SOB, + cough  GI: No nausea, vomiting, abdominal pain  Endocrine: No polyuria, polydipsia or S&Sx of hypoglycemia    Allergies    No Known Allergies    Intolerances      MEDICATIONS:  insulin glargine Injectable (LANTUS) 6 Unit(s) SubCutaneous at bedtime  insulin lispro (ADMELOG) corrective regimen sliding scale   SubCutaneous <User Schedule>  insulin lispro (ADMELOG) corrective regimen sliding scale   SubCutaneous three times a day before meals  insulin lispro Injectable (ADMELOG) 3 Unit(s) SubCutaneous three times a day before meals    PHYSICAL EXAM:  VITALS: T(C): 37.4 (01-24-24 @ 04:40)  T(F): 99.3 (01-24-24 @ 04:40), Max: 99.3 (01-24-24 @ 04:40)  HR: 93 (01-24-24 @ 04:40) (93 - 95)  BP: 151/68 (01-24-24 @ 04:40) (151/68 - 166/76)  RR:  (18 - 18)  SpO2:  (93% - 96%)  Wt(kg): --  GENERAL: Female laying in bed in NAD  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   Abdomen: Soft, nontender, non distended  Extremities: Warm, no edema in all 4 exts  NEURO: A&O X3      LABS:  POCT Blood Glucose.: 169 mg/dL (01-24-24 @ 12:28)  POCT Blood Glucose.: 88 mg/dL (01-24-24 @ 08:23)  POCT Blood Glucose.: 184 mg/dL (01-24-24 @ 02:04)  POCT Blood Glucose.: 210 mg/dL (01-23-24 @ 21:06)  POCT Blood Glucose.: 139 mg/dL (01-23-24 @ 17:17)  POCT Blood Glucose.: 143 mg/dL (01-23-24 @ 12:24)  POCT Blood Glucose.: 94 mg/dL (01-23-24 @ 08:08)  POCT Blood Glucose.: 112 mg/dL (01-23-24 @ 05:47)  POCT Blood Glucose.: 44 mg/dL (01-23-24 @ 04:56)  POCT Blood Glucose.: 116 mg/dL (01-22-24 @ 21:25)  POCT Blood Glucose.: 80 mg/dL (01-22-24 @ 16:47)  POCT Blood Glucose.: 97 mg/dL (01-22-24 @ 12:34)  POCT Blood Glucose.: 93 mg/dL (01-22-24 @ 11:59)  POCT Blood Glucose.: 209 mg/dL (01-22-24 @ 08:41)  POCT Blood Glucose.: 56 mg/dL (01-22-24 @ 08:09)  POCT Blood Glucose.: 119 mg/dL (01-21-24 @ 21:23)  POCT Blood Glucose.: 81 mg/dL (01-21-24 @ 16:51)                            10.3   9.92  )-----------( 275      ( 23 Jan 2024 06:57 )             33.7       01-23    145  |  105  |  9   ----------------------------<  104<H>  4.1   |  27  |  0.63    eGFR: 104    Ca    8.2<L>      01-23  Mg     1.9     01-23  Phos  2.1     01-23    TPro  6.3  /  Alb  3.2<L>  /  TBili  0.5  /  DBili  x   /  AST  20  /  ALT  14  /  AlkPhos  83  01-23    Thyroid Function Tests:  01-22 @ 07:22 TSH 1.25 FreeT4 1.4 T3 -- Anti TPO <10.0 Anti Thyroglobulin Ab -- TSI --  01-19 @ 17:44 TSH 1.10 FreeT4 0.9 T3 -- Anti TPO <10.0 Anti Thyroglobulin Ab -- TSI --      A1C with Estimated Average Glucose Result: 12.7 % (01-19-24 @ 17:44)      Estimated Average Glucose: 318 mg/dL (01-19-24 @ 17:44)

## 2024-01-24 NOTE — PROGRESS NOTE ADULT - PROBLEM SELECTOR PROBLEM 1
Diabetes mellitus type 1 with ketoacidosis

## 2024-01-24 NOTE — PROVIDER CONTACT NOTE (OTHER) - BACKGROUND
58yF PMHx subclinical hypothyroidism, [id number], HTN, HLD. P/w general malaise & productive cough x4days w/ poor PO intake. Pt reports she stopped taking insulin as she was not eating. Admit for DKA

## 2024-01-24 NOTE — PROGRESS NOTE ADULT - ASSESSMENT
58y F hx of T1DM, subclinical hypothyroidism, HTN, HLD, who presented to the ED for cough, general malaise, and n/v x3-4 days w/ influenza+ contacts found to be in DKA on admission  i/s/o likely flu and missed insulin dosing. Admitted to medicine s/p IVF 2L bolus and on maintenance IVF with basal/bolus insulin. DKA now resolved. However, patient continues to have episodes of hypoglycemia. Will need further monitoring to determine outpatient diabetes regimen.  58y F hx of T1DM, subclinical hypothyroidism, HTN, HLD, who presented to the ED for cough, general malaise, and n/v x3-4 days w/ influenza+ contacts found to be in DKA on admission  i/s/o likely flu and missed insulin dosing. Admitted to medicine s/p IVF 2L bolus and on maintenance IVF with basal/bolus insulin. DKA now resolved. Patients glucose levels now more stable. Will discuss with Endocrinology about discharge regimen.

## 2024-01-24 NOTE — PROGRESS NOTE ADULT - PROVIDER SPECIALTY LIST ADULT
Endocrinology
Internal Medicine
Endocrinology
Internal Medicine
Endocrinology
Internal Medicine
Internal Medicine

## 2024-01-24 NOTE — PROGRESS NOTE ADULT - NSPROGADDITIONALINFOA_GEN_ALL_CORE
-Plan discussed with pt/team.  Contact info: [phone] (24/7). pager [phone]  Amion on [place]  Teams on M-T-W-F. Unavailable Thu/Weekends/Holidays  Provided face to face education as well as assessed  pt/labs/meds and discussed plan with primary team  Adjusting insulin  Discharge plan  Follow up care

## 2024-01-24 NOTE — DISCHARGE NOTE NURSING/CASE MANAGEMENT/SOCIAL WORK - NSDCPEFALRISK_GEN_ALL_CORE
For information on Fall & Injury Prevention, visit: [email or web address] OR  [email or web address] OR  https://www.cdc.gov/steadi/patient.html [] : The components of the vaccine(s) to be administered today are listed in the plan of care. The disease(s) for which the vaccine(s) are intended to prevent and the risks have been discussed with the caretaker.  The risks are also included in the appropriate vaccination information statements which have been provided to the patient's caregiver.  The caregiver has given consent to vaccinate.

## 2024-01-24 NOTE — PROVIDER CONTACT NOTE (OTHER) - ASSESSMENT
Pt remains A+Ox4, no s+s of distress noted. VS as charted. Pt able to verbalize purpose of drawing labs

## 2024-01-24 NOTE — DISCHARGE NOTE NURSING/CASE MANAGEMENT/SOCIAL WORK - NSDCFUADDAPPT_GEN_ALL_CORE_FT
1) Please follow up with primary care doctor in 1-2 weeks of discharge. Please call [phone] to make an appointment.     2) Please follow up with endocrine after discharge. You will receive a call regarding your appointment.

## 2024-01-24 NOTE — PROGRESS NOTE ADULT - NUTRITIONAL ASSESSMENT
Diet, Regular:   Consistent Carbohydrate {No Snacks} (CSTCHO) (01-20-24 @ 14:58) [Active]      Needs RD consult

## 2024-01-24 NOTE — PROGRESS NOTE ADULT - SUBJECTIVE AND OBJECTIVE BOX
PROGRESS NOTE:   Authored by Paulo Odom MD  Patient is a 58y old  Female who presents with a chief complaint of DKA (23 Jan 2024 12:42)      SUBJECTIVE / OVERNIGHT EVENTS:  No acute events overnight.     ADDITIONAL REVIEW OF SYSTEMS:    MEDICATIONS  (STANDING):  benzonatate 100 milliGRAM(s) Oral three times a day  dextrose 5%. 1000 milliLiter(s) (100 mL/Hr) IV Continuous <Continuous>  dextrose 5%. 1000 milliLiter(s) (50 mL/Hr) IV Continuous <Continuous>  dextrose 50% Injectable 25 Gram(s) IV Push once  dextrose 50% Injectable 25 Gram(s) IV Push once  dextrose 50% Injectable 12.5 Gram(s) IV Push once  enoxaparin Injectable 40 milliGRAM(s) SubCutaneous every 24 hours  glucagon  Injectable 1 milliGRAM(s) IntraMuscular once  guaiFENesin  milliGRAM(s) Oral every 12 hours  insulin glargine Injectable (LANTUS) 6 Unit(s) SubCutaneous at bedtime  insulin lispro (ADMELOG) corrective regimen sliding scale   SubCutaneous <User Schedule>  insulin lispro (ADMELOG) corrective regimen sliding scale   SubCutaneous three times a day before meals  insulin lispro Injectable (ADMELOG) 3 Unit(s) SubCutaneous three times a day before meals  pantoprazole   Suspension 40 milliGRAM(s) Oral daily  potassium phosphate IVPB 15 milliMole(s) IV Intermittent once    MEDICATIONS  (PRN):  aluminum hydroxide/magnesium hydroxide/simethicone Suspension 30 milliLiter(s) Oral every 6 hours PRN Dyspepsia  dextrose Oral Gel 15 Gram(s) Oral once PRN Blood Glucose LESS THAN 70 milliGRAM(s)/deciliter  dextrose Oral Gel 15 Gram(s) Oral once PRN Blood Glucose LESS THAN 70 milliGRAM(s)/deciliter      CAPILLARY BLOOD GLUCOSE      POCT Blood Glucose.: 184 mg/dL (24 Jan 2024 02:04)  POCT Blood Glucose.: 210 mg/dL (23 Jan 2024 21:06)  POCT Blood Glucose.: 139 mg/dL (23 Jan 2024 17:17)  POCT Blood Glucose.: 143 mg/dL (23 Jan 2024 12:24)  POCT Blood Glucose.: 94 mg/dL (23 Jan 2024 08:08)    I&O's Summary    23 Jan 2024 07:01  -  24 Jan 2024 07:00  --------------------------------------------------------  IN: 120 mL / OUT: 0 mL / NET: 120 mL        PHYSICAL EXAM:  Vital Signs Last 24 Hrs  T(C): 37.4 (24 Jan 2024 04:40), Max: 37.4 (24 Jan 2024 04:40)  T(F): 99.3 (24 Jan 2024 04:40), Max: 99.3 (24 Jan 2024 04:40)  HR: 93 (24 Jan 2024 04:40) (87 - 95)  BP: 151/68 (24 Jan 2024 04:40) (125/69 - 166/76)  BP(mean): --  RR: 18 (24 Jan 2024 04:40) (18 - 18)  SpO2: 93% (24 Jan 2024 04:40) (93% - 96%)    Parameters below as of 24 Jan 2024 04:40  Patient On (Oxygen Delivery Method): room air        GENERAL: No apparent distress.   HEAD:  Atraumatic, Normocephalic  EYES: EOMI, PERRLA, conjunctiva and sclera clear  NECK: Supple, no lymphadenopathy, no elevated JVP  CHEST/LUNG: Clear to auscultation bilateral and symmetric; No wheezes, rales, or rhonchi  HEART: S1 and S2 normal. Regular rate and rhythm; No murmurs, rubs, or gallops  ABDOMEN: Soft, non-tender, non-distended; normal bowel sounds  EXTREMITIES:  2+ peripheral pulses b/l, No clubbing, cyanosis, or edema  NEUROLOGY: A&O x 3, no focal deficits  SKIN: No rashes or lesions    LABS:                        10.3   9.92  )-----------( 275      ( 23 Jan 2024 06:57 )             33.7     01-23    145  |  105  |  9   ----------------------------<  104<H>  4.1   |  27  |  0.63    Ca    8.2<L>      23 Jan 2024 06:56  Phos  2.1     01-23  Mg     1.9     01-23    TPro  6.3  /  Alb  3.2<L>  /  TBili  0.5  /  DBili  x   /  AST  20  /  ALT  14  /  AlkPhos  83  01-23          Urinalysis Basic - ( 23 Jan 2024 06:56 )    Color: x / Appearance: x / SG: x / pH: x  Gluc: 104 mg/dL / Ketone: x  / Bili: x / Urobili: x   Blood: x / Protein: x / Nitrite: x   Leuk Esterase: x / RBC: x / WBC x   Sq Epi: x / Non Sq Epi: x / Bacteria: x          RADIOLOGY & ADDITIONAL TESTS:  Lab Results Reviewed   Imaging Reviewed  Electrocardiogram Reviewed   PROGRESS NOTE:   Authored by Paulo Odom MD  Patient is a 58y old  Female who presents with a chief complaint of DKA (23 Jan 2024 12:42)      SUBJECTIVE / OVERNIGHT EVENTS:  No acute events overnight. Patient states that she is feeling better today and is very hungry. Still has a cough that is stable from yesterday. Denies fevers, chills, chest pain or dyspnea.     ADDITIONAL REVIEW OF SYSTEMS: All other systems negative unless otherwise specified.     MEDICATIONS  (STANDING):  benzonatate 100 milliGRAM(s) Oral three times a day  dextrose 5%. 1000 milliLiter(s) (100 mL/Hr) IV Continuous <Continuous>  dextrose 5%. 1000 milliLiter(s) (50 mL/Hr) IV Continuous <Continuous>  dextrose 50% Injectable 25 Gram(s) IV Push once  dextrose 50% Injectable 25 Gram(s) IV Push once  dextrose 50% Injectable 12.5 Gram(s) IV Push once  enoxaparin Injectable 40 milliGRAM(s) SubCutaneous every 24 hours  glucagon  Injectable 1 milliGRAM(s) IntraMuscular once  guaiFENesin  milliGRAM(s) Oral every 12 hours  insulin glargine Injectable (LANTUS) 6 Unit(s) SubCutaneous at bedtime  insulin lispro (ADMELOG) corrective regimen sliding scale   SubCutaneous <User Schedule>  insulin lispro (ADMELOG) corrective regimen sliding scale   SubCutaneous three times a day before meals  insulin lispro Injectable (ADMELOG) 3 Unit(s) SubCutaneous three times a day before meals  pantoprazole   Suspension 40 milliGRAM(s) Oral daily  potassium phosphate IVPB 15 milliMole(s) IV Intermittent once    MEDICATIONS  (PRN):  aluminum hydroxide/magnesium hydroxide/simethicone Suspension 30 milliLiter(s) Oral every 6 hours PRN Dyspepsia  dextrose Oral Gel 15 Gram(s) Oral once PRN Blood Glucose LESS THAN 70 milliGRAM(s)/deciliter  dextrose Oral Gel 15 Gram(s) Oral once PRN Blood Glucose LESS THAN 70 milliGRAM(s)/deciliter      CAPILLARY BLOOD GLUCOSE      POCT Blood Glucose.: 184 mg/dL (24 Jan 2024 02:04)  POCT Blood Glucose.: 210 mg/dL (23 Jan 2024 21:06)  POCT Blood Glucose.: 139 mg/dL (23 Jan 2024 17:17)  POCT Blood Glucose.: 143 mg/dL (23 Jan 2024 12:24)  POCT Blood Glucose.: 94 mg/dL (23 Jan 2024 08:08)    I&O's Summary    23 Jan 2024 07:01  -  24 Jan 2024 07:00  --------------------------------------------------------  IN: 120 mL / OUT: 0 mL / NET: 120 mL        PHYSICAL EXAM:  Vital Signs Last 24 Hrs  T(C): 37.4 (24 Jan 2024 04:40), Max: 37.4 (24 Jan 2024 04:40)  T(F): 99.3 (24 Jan 2024 04:40), Max: 99.3 (24 Jan 2024 04:40)  HR: 93 (24 Jan 2024 04:40) (87 - 95)  BP: 151/68 (24 Jan 2024 04:40) (125/69 - 166/76)  BP(mean): --  RR: 18 (24 Jan 2024 04:40) (18 - 18)  SpO2: 93% (24 Jan 2024 04:40) (93% - 96%)    Parameters below as of 24 Jan 2024 04:40  Patient On (Oxygen Delivery Method): room air        GENERAL: No apparent distress.   HEAD:  Atraumatic, Normocephalic  EYES: EOMI, PERRLA, conjunctiva and sclera clear  NECK: Supple, no lymphadenopathy, no elevated JVP  CHEST/LUNG: Clear to auscultation bilateral and symmetric; No wheezes, rales, or rhonchi  HEART: S1 and S2 normal. Regular rate and rhythm; No murmurs, rubs, or gallops  ABDOMEN: Soft, non-tender, non-distended; normal bowel sounds  EXTREMITIES:  2+ peripheral pulses b/l, No clubbing, cyanosis, or edema  NEUROLOGY: A&O x 3, no focal deficits  SKIN: No rashes or lesions    LABS:                        10.3   9.92  )-----------( 275      ( 23 Jan 2024 06:57 )             33.7     01-23    145  |  105  |  9   ----------------------------<  104<H>  4.1   |  27  |  0.63    Ca    8.2<L>      23 Jan 2024 06:56  Phos  2.1     01-23  Mg     1.9     01-23    TPro  6.3  /  Alb  3.2<L>  /  TBili  0.5  /  DBili  x   /  AST  20  /  ALT  14  /  AlkPhos  83  01-23          Urinalysis Basic - ( 23 Jan 2024 06:56 )    Color: x / Appearance: x / SG: x / pH: x  Gluc: 104 mg/dL / Ketone: x  / Bili: x / Urobili: x   Blood: x / Protein: x / Nitrite: x   Leuk Esterase: x / RBC: x / WBC x   Sq Epi: x / Non Sq Epi: x / Bacteria: x          RADIOLOGY & ADDITIONAL TESTS:  Lab Results Reviewed   Imaging Reviewed  Electrocardiogram Reviewed

## 2024-01-24 NOTE — PROGRESS NOTE ADULT - ASSESSMENT
57 yo F /w/h/o uncontrolled T1DM (A1C 12.7%) on Basaglar/Admelog. Pt didn't take insulin since she got sick. DM c/b retinopathy, nephropathy, neuropathy. Also h/o subclinical hypothyroidism, HTN, and HLD. Here with DKA, prompting endocrinology consult. Slowly improving PO intake with FBG still <100s but without hypoglycemia. Will continue to adjust insulin to keep BG goal 100 to 180s without further hypoglycemia.     Met with patient and reviewed discharge plan of care.

## 2024-01-24 NOTE — DISCHARGE NOTE NURSING/CASE MANAGEMENT/SOCIAL WORK - PATIENT PORTAL LINK FT
You can access the FollowMyHealth Patient Portal offered by [facility] by registering at the following website: [email or web address]. By joining [employer]’s FollowMyHealth portal, you will also be able to view your health information using other applications (apps) compatible with our system.

## 2024-04-11 ENCOUNTER — APPOINTMENT (OUTPATIENT)
Dept: ENDOCRINOLOGY | Facility: HOSPITAL | Age: 59
End: 2024-04-11

## 2024-06-11 RX ORDER — BLOOD-GLUCOSE SENSOR
EACH MISCELLANEOUS
Qty: 9 | Refills: 3 | Status: ACTIVE | COMMUNITY
Start: 2022-02-11 | End: 1900-01-01

## 2024-06-11 RX ORDER — BLOOD-GLUCOSE TRANSMITTER
EACH MISCELLANEOUS
Qty: 1 | Refills: 3 | Status: ACTIVE | COMMUNITY
Start: 2022-02-11 | End: 1900-01-01

## 2024-07-08 NOTE — PROGRESS NOTE ADULT - PROBLEM SELECTOR PLAN 3
LDL goal <70 due to DM  Pt LDL NA  Order fasting lipid if not recently done  Resume crestor 5mg daily on discharge  Manage per primary team   F/u levels as out pt Detail Level: Zone

## 2024-07-15 NOTE — ED ADULT NURSE NOTE - SUICIDE SCREENING QUESTION 3
Last Visit Date: 5/20/2024   Next Visit Date: 8/20/2024      Continuous Video EEG Continuous Video EEG Continuous Video EEG No

## 2025-06-06 NOTE — PROGRESS NOTE ADULT - PROBLEM/PLAN-1
DISPLAY PLAN FREE TEXT
No. NORBERTO screening performed.  STOP BANG Legend: 0-2 = LOW Risk; 3-4 = INTERMEDIATE Risk; 5-8 = HIGH Risk